# Patient Record
Sex: FEMALE | Race: WHITE | NOT HISPANIC OR LATINO | Employment: OTHER | URBAN - METROPOLITAN AREA
[De-identification: names, ages, dates, MRNs, and addresses within clinical notes are randomized per-mention and may not be internally consistent; named-entity substitution may affect disease eponyms.]

---

## 2017-11-30 ENCOUNTER — OFFICE VISIT (OUTPATIENT)
Dept: URGENT CARE | Facility: CLINIC | Age: 62
End: 2017-11-30
Payer: COMMERCIAL

## 2017-11-30 VITALS
RESPIRATION RATE: 17 BRPM | HEART RATE: 75 BPM | BODY MASS INDEX: 30.75 KG/M2 | SYSTOLIC BLOOD PRESSURE: 131 MMHG | OXYGEN SATURATION: 98 % | HEIGHT: 72 IN | DIASTOLIC BLOOD PRESSURE: 75 MMHG | TEMPERATURE: 99 F | WEIGHT: 227 LBS

## 2017-11-30 DIAGNOSIS — T23.262A PARTIAL THICKNESS BURN OF BACK OF LEFT HAND, INITIAL ENCOUNTER: Primary | ICD-10-CM

## 2017-11-30 PROCEDURE — 99203 OFFICE O/P NEW LOW 30 MIN: CPT | Mod: S$GLB,,, | Performed by: EMERGENCY MEDICINE

## 2017-11-30 RX ORDER — DOXYCYCLINE HYCLATE 50 MG/1
50 CAPSULE ORAL NIGHTLY
COMMUNITY
Start: 2017-11-16

## 2017-11-30 RX ORDER — VALSARTAN 80 MG/1
TABLET ORAL
COMMUNITY
Start: 2017-09-28 | End: 2019-02-25

## 2017-11-30 RX ORDER — TRIAMCINOLONE ACETONIDE 1 MG/G
CREAM TOPICAL
COMMUNITY
Start: 2017-10-18 | End: 2019-02-25

## 2017-11-30 RX ORDER — PANTOPRAZOLE SODIUM 40 MG/1
TABLET, DELAYED RELEASE ORAL
COMMUNITY
Start: 2017-11-29

## 2017-11-30 RX ORDER — ESTRADIOL 0.05 MG/D
FILM, EXTENDED RELEASE TRANSDERMAL
COMMUNITY
Start: 2017-11-26

## 2017-11-30 RX ORDER — PROGESTERONE 100 MG/1
CAPSULE ORAL
COMMUNITY
Start: 2017-09-07
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
## 2017-11-30 NOTE — PATIENT INSTRUCTIONS
Please return here or go to the Emergency Department for any concerns or worsening of condition.  If you were prescribed antibiotics, please take them to completion.  If you were prescribed a narcotic medication, do not drive or operate heavy equipment or machinery while taking these medications.  Please follow up with your primary care doctor or specialist as needed.  If you  smoke, please stop smoking.      Burn Wound: Wound Check, No Infection  Your burn is healing as expected.  Home care  Follow these guidelines when caring for yourself at home:  · If a bandage was put on, you should change it once a day, unless told otherwise. If the bandage sticks, soak it off in warm water. A bandage left in place too long can make an infection worse.  · Wash the area with soap and water to remove all cream, ointment, ooze, or scab. You may do this in a sink, under a tub faucet, or in the shower. Rinse off the soap and pat dry with a clean towel. Look for signs of infection.  · Put cream or ointment on the wound to prevent infection and to keep the bandage from sticking.  · Cover the burn with nonstick gauze. Then wrap it with the bandage material.  · If the bandage becomes wet or soiled, change it as soon as you can.  · You may use acetaminophen or ibuprofen to control pain, unless another pain medicine was prescribed. If you have chronic liver or kidney disease, talk with your healthcare provider before using these medicines. Also talk with your provider if youve had a stomach ulcer or GI (gastrointestinal) bleeding.  · Ask your provider if you need a tetanus shot.  Follow-up care  Follow up with your healthcare provider, or as advised. Most burns heal without infection. Sometimes an infection may occur even with proper treatment. So check the burn every day for the signs of infection listed below.  When to seek medical advice  Call your healthcare provider right away if any of these occur:  · Pain in the wound gets  worse  · Redness or swelling gets worse  · Pus comes from the wound  · Fever of 100.4°F (38°C) or higher, or as directed by your healthcare provider  Date Last Reviewed: 1/1/2017  © 4172-7209 [employer]. [address] [place] [address], [place] [address]. All rights reserved. This information is not intended as a substitute for professional medical care. Always follow your healthcare professional's instructions.

## 2017-12-04 ENCOUNTER — TELEPHONE (OUTPATIENT)
Dept: URGENT CARE | Facility: CLINIC | Age: 62
End: 2017-12-04

## 2018-11-30 ENCOUNTER — OFFICE VISIT (OUTPATIENT)
Dept: URGENT CARE | Facility: CLINIC | Age: 63
End: 2018-11-30
Payer: COMMERCIAL

## 2018-11-30 VITALS
HEART RATE: 70 BPM | DIASTOLIC BLOOD PRESSURE: 85 MMHG | HEIGHT: 72 IN | BODY MASS INDEX: 30.75 KG/M2 | OXYGEN SATURATION: 97 % | WEIGHT: 227 LBS | SYSTOLIC BLOOD PRESSURE: 132 MMHG | TEMPERATURE: 98 F | RESPIRATION RATE: 16 BRPM

## 2018-11-30 DIAGNOSIS — H11.31 SUBCONJUNCTIVAL HEMORRHAGE OF RIGHT EYE: Primary | ICD-10-CM

## 2018-11-30 DIAGNOSIS — R51.9 RIGHT-SIDED HEADACHE: ICD-10-CM

## 2018-11-30 PROCEDURE — 3008F BODY MASS INDEX DOCD: CPT | Mod: CPTII,S$GLB,, | Performed by: FAMILY MEDICINE

## 2018-11-30 PROCEDURE — 99214 OFFICE O/P EST MOD 30 MIN: CPT | Mod: S$GLB,,, | Performed by: FAMILY MEDICINE

## 2018-11-30 RX ORDER — CANDESARTAN 16 MG/1
16 TABLET ORAL DAILY
COMMUNITY
End: 2020-10-06

## 2018-11-30 NOTE — PROGRESS NOTES
Subjective:       Patient ID: Rena Del Valle is a 63 y.o. female.    Vitals:  height is 6' (1.829 m) and weight is 103 kg (227 lb). Her temperature is 97.5 °F (36.4 °C). Her blood pressure is 132/85 and her pulse is 70. Her respiration is 16 and oxygen saturation is 97%.     Chief Complaint: Eye Problem and Headache    Pt states she has right sided head pain and right eye pain. No Trauma. Pt states she only has a headache in the right temporal region when she bends over.        Constitution: Negative for chills, fatigue and fever.   HENT: Negative for congestion and sore throat.    Neck: Negative for painful lymph nodes.   Cardiovascular: Negative for chest pain and leg swelling.   Eyes: Negative for double vision and blurred vision.   Respiratory: Negative for cough and shortness of breath.    Gastrointestinal: Negative for nausea, vomiting and diarrhea.   Genitourinary: Negative for dysuria, frequency, urgency and history of kidney stones.   Musculoskeletal: Negative for joint pain, joint swelling, muscle cramps and muscle ache.   Skin: Negative for color change, pale, rash and bruising.   Allergic/Immunologic: Negative for seasonal allergies.   Neurological: Positive for headaches. Negative for dizziness, history of vertigo, light-headedness and passing out.   Hematologic/Lymphatic: Negative for swollen lymph nodes.   Psychiatric/Behavioral: Negative for nervous/anxious, sleep disturbance and depression. The patient is not nervous/anxious.        Objective:      Physical Exam   Constitutional: She is oriented to person, place, and time. She appears well-developed and well-nourished.   HENT:   Head: Normocephalic and atraumatic.       Right Ear: External ear normal.   Left Ear: External ear normal.   Nose: Nose normal.   Mouth/Throat: Oropharynx is clear and moist.   Eyes: EOM and lids are normal. Pupils are equal, round, and reactive to light. Right conjunctiva has a hemorrhage (with eversion of lid).       Neck:  Trachea normal, full passive range of motion without pain and phonation normal. Neck supple.   Cardiovascular: Normal rate.   Pulmonary/Chest: Effort normal.   Musculoskeletal: Normal range of motion.   Neurological: She is alert and oriented to person, place, and time.   Skin: Skin is warm, dry and intact.   Psychiatric: She has a normal mood and affect. Her speech is normal and behavior is normal. Judgment and thought content normal. Cognition and memory are normal.   Nursing note and vitals reviewed.      Assessment:       1. Subconjunctival hemorrhage of right eye    2. Right-sided headache        Plan:         Subconjunctival hemorrhage of right eye    Right-sided headache        advised that blood tinged eye discharge from subconj hem visible in right. Advised that I would want someone to visualize her retina/optic nerve to see if any pathology. She will try her ophthalmologist.  Pt v/u. Advised that she should go to ED if HA is persistent. Pt v/u

## 2019-01-17 ENCOUNTER — OFFICE VISIT (OUTPATIENT)
Dept: OTOLARYNGOLOGY | Facility: CLINIC | Age: 64
End: 2019-01-17
Payer: COMMERCIAL

## 2019-01-17 VITALS
HEART RATE: 86 BPM | DIASTOLIC BLOOD PRESSURE: 79 MMHG | WEIGHT: 232.38 LBS | SYSTOLIC BLOOD PRESSURE: 140 MMHG | BODY MASS INDEX: 31.51 KG/M2

## 2019-01-17 DIAGNOSIS — E04.1 NONTOXIC THYROID NODULE: ICD-10-CM

## 2019-01-17 PROCEDURE — 99205 OFFICE O/P NEW HI 60 MIN: CPT | Mod: S$GLB,,, | Performed by: OTOLARYNGOLOGY

## 2019-01-17 PROCEDURE — 99999 PR PBB SHADOW E&M-EST. PATIENT-LVL III: ICD-10-PCS | Mod: PBBFAC,,, | Performed by: OTOLARYNGOLOGY

## 2019-01-17 PROCEDURE — 99999 PR PBB SHADOW E&M-EST. PATIENT-LVL III: CPT | Mod: PBBFAC,,, | Performed by: OTOLARYNGOLOGY

## 2019-01-17 PROCEDURE — 99205 PR OFFICE/OUTPT VISIT, NEW, LEVL V, 60-74 MIN: ICD-10-PCS | Mod: S$GLB,,, | Performed by: OTOLARYNGOLOGY

## 2019-01-17 PROCEDURE — 3008F BODY MASS INDEX DOCD: CPT | Mod: CPTII,S$GLB,, | Performed by: OTOLARYNGOLOGY

## 2019-01-17 PROCEDURE — 3008F PR BODY MASS INDEX (BMI) DOCUMENTED: ICD-10-PCS | Mod: CPTII,S$GLB,, | Performed by: OTOLARYNGOLOGY

## 2019-01-17 NOTE — PROGRESS NOTES
[facility]    Subjective:       Patient ID: Rena Del Valle is a 63 y.o. female.    Chief Complaint: Consult (thyroid nodule)    HPI  Rena Del Valle is a 63 y.o. female who presents for evaluation of a several week history of a central neck mass/goiter. She has not noticed a central mass in the neck. Her saga started in the fall when she had routine blood work that revealed elevated TPO antibodies.  She remained euthyroid, however.  She had an ultrasound and then an FNA at that time. She has left-sided nodules evident on ultrasound, with a dominant nodule evident on the left.     The cytology from this FNA revealed atypia of uncertain significance, so the Affirma test was performed, which in turn place the risk of malignancy around 50%.  She had been working with the general surgeon on the [place] and then decided to seek some other opinions.  She has family members that work at [facility] as well as here, so she presents for an opinion.  Other than subjectively being aware of the mass, she is predominantly asymptomatic.    Recently, She has had some compressive symptoms such as a globus sensation and mild dysphagia. She thinks that her voice is occasionally hoarse, but mostly normal. She denies odynophagia, throat pain, and otalgia. She is a non-smoker. She denies cough and throat clearing. There is not hemoptysis or hematemesis. She does not  experience shortness of breath occasionally. There is no family history of thyroid disease or cancer. There is no personal history of radiation exposure or treatment to the head and neck region.    Past Medical History:   Diagnosis Date    Breast cyst     GERD (gastroesophageal reflux disease)     Nontoxic thyroid nodule 1/27/2019       Past Surgical History:   Procedure Laterality Date    BREAST BIOPSY      pt does not remember    SHOULDER SURGERY Right          Current Outpatient Medications:     candesartan (ATACAND) 16 MG tablet, Take 16 mg  by mouth once daily., Disp: , Rfl:     doxycycline (VIBRAMYCIN) 50 MG capsule, , Disp: , Rfl:     estradiol 0.05 mg/24 hr td ptsw (VIVELLE-DOT) 0.05 mg/24 hr, , Disp: , Rfl:     pantoprazole (PROTONIX) 40 MG tablet, , Disp: , Rfl:     progesterone (PROMETRIUM) 100 MG capsule, , Disp: , Rfl:     ranitidine (ZANTAC) 300 MG tablet, , Disp: , Rfl:     spironolactone-hydrochlorothiazide (ALDACTAZIDE) 50-50 mg per tablet, Take 1 tablet by mouth once daily., Disp: , Rfl:     triamcinolone acetonide 0.1% (KENALOG) 0.1 % cream, , Disp: , Rfl:     valsartan (DIOVAN) 80 MG tablet, , Disp: , Rfl:     Review of patient's allergies indicates:   Allergen Reactions    Ciprofloxacin        Social History     Socioeconomic History    Marital status:      Spouse name: Not on file    Number of children: Not on file    Years of education: Not on file    Highest education level: Not on file   Social Needs    Financial resource strain: Not on file    Food insecurity - worry: Not on file    Food insecurity - inability: Not on file    Transportation needs - medical: Not on file    Transportation needs - non-medical: Not on file   Occupational History    Not on file   Tobacco Use    Smoking status: Never Smoker    Smokeless tobacco: Never Used   Substance and Sexual Activity    Alcohol use: No    Drug use: Not on file    Sexual activity: Not on file   Other Topics Concern    Not on file   Social History Narrative    Not on file       Family History   Problem Relation Age of Onset    Breast cancer Maternal Aunt         60's    Hypertension Mother     Hypertension Father     Stroke Father        Review of Systems   Constitutional: Negative for activity change, appetite change, chills, fatigue, fever and unexpected weight change.   HENT: Positive for trouble swallowing. Negative for congestion, dental problem, drooling, ear discharge, ear pain, facial swelling, hearing loss, mouth sores, nosebleeds,  postnasal drip, rhinorrhea, sinus pressure, sneezing, sore throat, tinnitus and voice change.    Eyes: Negative for pain and visual disturbance.   Respiratory: Negative for cough, choking and shortness of breath.    Cardiovascular: Negative for chest pain, palpitations and leg swelling.   Gastrointestinal: Negative for abdominal pain, constipation, diarrhea, nausea and vomiting.   Endocrine: Negative for cold intolerance and heat intolerance.   Genitourinary: Negative for difficulty urinating, dysuria and hematuria.   Musculoskeletal: Negative for arthralgias, neck pain and neck stiffness.   Skin: Negative for rash and wound.   Allergic/Immunologic: Negative for environmental allergies and immunocompromised state.   Neurological: Negative for dizziness, facial asymmetry, speech difficulty, weakness, light-headedness, numbness and headaches.   Hematological: Negative for adenopathy. Does not bruise/bleed easily.   Psychiatric/Behavioral: Negative for dysphoric mood. The patient is not nervous/anxious.        Objective:     Physical Exam   Constitutional: She is oriented to person, place, and time. She appears well-developed and well-nourished. No distress.   HENT:   Head: Normocephalic and atraumatic.       Right Ear: Hearing, tympanic membrane, external ear and ear canal normal.   Left Ear: Hearing, tympanic membrane, external ear and ear canal normal.   Nose: No rhinorrhea, nasal deformity or septal deviation. No epistaxis. Right sinus exhibits no maxillary sinus tenderness and no frontal sinus tenderness. Left sinus exhibits no maxillary sinus tenderness and no frontal sinus tenderness.   Mouth/Throat: Uvula is midline, oropharynx is clear and moist and mucous membranes are normal. She does not have dentures. No oral lesions. No trismus in the jaw. Normal dentition. No dental caries. No oropharyngeal exudate or posterior oropharyngeal edema.   NP: No lesions of posterior wall  OP: No lesions of posterior wall or  BOT. BOT is soft to palpation  Larynx: No lesions of glottic or supraglottic larynx. Normal vocal fold mobility    HP: No lesions of pyriform sinuses or postcricoid region  Mirror examination was performed.     Eyes: EOM and lids are normal. Pupils are equal, round, and reactive to light. Right eye exhibits no chemosis. Left eye exhibits no chemosis. No scleral icterus.   Neck: Trachea normal, normal range of motion, full passive range of motion without pain and phonation normal. No muscular tenderness present. Carotid bruit is not present. No thyroid mass and no thyromegaly present.   Cardiovascular: Normal rate, regular rhythm and intact distal pulses.   Pulmonary/Chest: Effort normal. No accessory muscle usage or stridor. No respiratory distress.   Abdominal: Normal appearance. There is no tenderness.   Musculoskeletal:        Right shoulder: She exhibits normal range of motion and no deformity.        Left shoulder: She exhibits normal range of motion and no deformity.   Lymphadenopathy:        Head (right side): No submental and no occipital adenopathy present.        Head (left side): No submental and no occipital adenopathy present.     She has no cervical adenopathy.        Right cervical: No deep cervical and no posterior cervical adenopathy present.       Left cervical: No deep cervical and no posterior cervical adenopathy present.   Neurological: She is alert and oriented to person, place, and time. No cranial nerve deficit or sensory deficit. Gait normal.   Skin: Skin is warm and intact. No lesion and no rash noted.   Psychiatric: She has a normal mood and affect. Her speech is normal and behavior is normal. Thought content normal.   Vitals reviewed.       Thyroid US 10/30/2018:  FINDINGS:  The right lobe of the thyroid measures 4.5 x 1.2 x 1.2 cm with a heterogeneous echotexture.  No significant increased vascularity to the right lobe of the thyroid.    Isthmus measures approximately 3 mm with  heterogeneous echotexture.  No significant soft tissue nodules noted.    The left lobe of the thyroid measures 4.6 x 1.6 x 1.5 cm with heterogeneous echogenicity.  In the lower pole of the left lobe there is a solid mostly hypoechoic nodule with slight increased peripheral vascularity measuring approximately 2.3 x 1.4 x 1.4 cm.  In the upper pole no significant soft tissue masses are visualized.      Assessment & Plan:       Problem List Items Addressed This Visit     Nontoxic thyroid nodule     Ms. Del Valle has a 2.3cm in maximum dimension left thyroid nodule in the inferior pole. This was shown to be AUS on FNA, and subsequent Affirma testing placed its risk of malignancy at 50%, which is consistent with the risk for such a histopathologic report.  She has a multitude of questions about thyroid disease, thyroid surgery, and, importantly, her options.  I spent over an hour discussing these and subsequent questions with her and her family, and all questions were answered to their apparent satisfaction.    We discussed how common thyroid nodules are in the general population. However, I explained that the vast majority of thyroid nodules are not malignant and that there are certain steps that we have to take to evaluate thyroid nodules for malignancy.   I then discussed with the patient the potential outcomes of the fine-needle aspiration in broad terms: Benign, malignant or suspicious, AUS/FLUS/follicular lesion/suspicious for follicular neoplasm (which I described as suspicious for being suspicious, suspicious for being suspicious for being suspicious, and suspicious for being suspicious for being suspicious for being suspicious), or indeterminate.  Recommendations for treatment are predicated on this result.  In general benign lesions can be followed with intermittent ultrasound.  My recommendation for malignant or suspicious lesions is total thyroidectomy, potentially with radioactive iodine remnant ablation in the  setting of malignancy.  Follicular lesions merit thyroid lobectomy or total thyroidectomy, depending on the specific clinical picture, with a stronger recommendation for surgery when atypia of uncertain significance is encountered.  Similarly, either repeat ultrasound-guided needle biopsy or surgery is a reasonable course of action for the indeterminate biopsy result, again depending on the specific clinical picture.      I offered the patient 3 options: observation with repeat ultrasound 6 months, left thyroid lobectomy, and total thyroidectomy.   We discussed the relative risks and benefits of each approach. Although surgery may be avoided with observation, there is a chance that malignancy could go undetected, allowing for potential growth or even metastasis. With her AUS report, I am not comfortable with recommending this, but would closely follow her in a shared decision making environment if that is what she wanted.  A thyroid lobectomy limits the surgical risk and offers a roughly 40-50% chance that she will not require synthroid replacement therapy - except in the setting of Hashimoto's thyroiditis, which she has. If malignancy is detected on permanent section, then a second procedure would be required within 2-4 weeks for completion thyroidectomy. Although there is no difference in the risk of surgical complications with delayed total thyroidectomy versus immediate total thyroidectomy, that would require a second anesthetic. Frozen section analysis could be accomplished, but there are limitations on its accuracy in certain conditions, particularly follicular thyroid neoplasms. Finally, a total thyroidectomy could be accomplished, which would limit the number of procedures required, but the chance exists that no malignancy would be detected and the patient would now be obligated to synthroid replacement therapy for the rest of her life.  She is not interested in total thyroidectomy if it can be  avoided.    As such,  because the nodule was read as AUS/50% risk in the setting of a single dominant nodule, I have recommended a left thyroid lobectomy and isthmusectomy, with potential total thyroidectomy if there are obvious signs of malignancy such as clinically abnormal lymph nodes.    After this, I explained the risks of thyroidectomy include, but are not limited to, infection, bleeding, scarring, failure to achieve the diagnosis, no evidence of cancer, recurrence, collection of blood or tissue fluid requiring drainage, injury to the recurrent laryngeal nerve with resultant temporary or permanent hoarseness (1% permanent risk with up to 10% temporary risk, greater in revision operations), injury to the superior laryngeal nerve with resultant loss of the upper register for singing or challenges with yelling, temporary or permanent hypocalcemia related to injury or devascularization of the parathyroid glands (less than 5% permanent, up to 30-60% when paratracheal dissection is accomplished, again greater in revision operations), and the need for additional procedures or therapies.  Time was allowed for questions, and all questions were answered to the patient's apparent satisfaction. The risks of paratracheal lymph node dissection are included above.     She is still contemplating a trip to [place] and will have her slides reviewed there.  By the time of note completion, I have included the [facility] report for our records.  Her other records have been scanned into Epic.

## 2019-01-21 ENCOUNTER — PATIENT MESSAGE (OUTPATIENT)
Dept: OTOLARYNGOLOGY | Facility: CLINIC | Age: 64
End: 2019-01-21

## 2019-01-23 ENCOUNTER — TELEPHONE (OUTPATIENT)
Dept: OTOLARYNGOLOGY | Facility: CLINIC | Age: 64
End: 2019-01-23

## 2019-01-23 ENCOUNTER — PATIENT MESSAGE (OUTPATIENT)
Dept: OTOLARYNGOLOGY | Facility: CLINIC | Age: 64
End: 2019-01-23

## 2019-01-23 NOTE — TELEPHONE ENCOUNTER
----- Message from Vannessa Robin sent at 1/23/2019 10:19 AM CST -----  Contact: Pt  PT called to speak to the nurse to give the provider the telephone number for [facility] which is [phone], so that the doctor can request the [facility] Pathology Report. Pt stated that her Medical Record Number with [facility] Brown is [id number]. Pt also stated that she left a previous message with the doctor's office but haven't received a return call back.     Pt can be reached at [phone].    Thanks

## 2019-01-24 ENCOUNTER — PATIENT MESSAGE (OUTPATIENT)
Dept: OTOLARYNGOLOGY | Facility: CLINIC | Age: 64
End: 2019-01-24

## 2019-01-25 ENCOUNTER — PATIENT MESSAGE (OUTPATIENT)
Dept: OTOLARYNGOLOGY | Facility: CLINIC | Age: 64
End: 2019-01-25

## 2019-01-27 DIAGNOSIS — E04.1 NONTOXIC THYROID NODULE: Primary | ICD-10-CM

## 2019-01-27 RX ORDER — DEXAMETHASONE SODIUM PHOSPHATE 4 MG/ML
12 INJECTION, SOLUTION INTRA-ARTICULAR; INTRALESIONAL; INTRAMUSCULAR; INTRAVENOUS; SOFT TISSUE ONCE
Status: CANCELLED | OUTPATIENT
Start: 2019-02-27

## 2019-01-27 RX ORDER — LIDOCAINE HYDROCHLORIDE 10 MG/ML
1 INJECTION, SOLUTION EPIDURAL; INFILTRATION; INTRACAUDAL; PERINEURAL ONCE
Status: CANCELLED | OUTPATIENT
Start: 2019-01-27 | End: 2019-01-27

## 2019-01-27 NOTE — ASSESSMENT & PLAN NOTE
Ms. Del Valle has a 2.3cm in maximum dimension left thyroid nodule in the inferior pole. This was shown to be AUS on FNA, and subsequent Affirma testing placed its risk of malignancy at 50%, which is consistent with the risk for such a histopathologic report.  She has a multitude of questions about thyroid disease, thyroid surgery, and, importantly, her options.  I spent over an hour discussing these and subsequent questions with her and her family, and all questions were answered to their apparent satisfaction.    We discussed how common thyroid nodules are in the general population. However, I explained that the vast majority of thyroid nodules are not malignant and that there are certain steps that we have to take to evaluate thyroid nodules for malignancy.   I then discussed with the patient the potential outcomes of the fine-needle aspiration in broad terms: Benign, malignant or suspicious, AUS/FLUS/follicular lesion/suspicious for follicular neoplasm (which I described as suspicious for being suspicious, suspicious for being suspicious for being suspicious, and suspicious for being suspicious for being suspicious for being suspicious), or indeterminate.  Recommendations for treatment are predicated on this result.  In general benign lesions can be followed with intermittent ultrasound.  My recommendation for malignant or suspicious lesions is total thyroidectomy, potentially with radioactive iodine remnant ablation in the setting of malignancy.  Follicular lesions merit thyroid lobectomy or total thyroidectomy, depending on the specific clinical picture, with a stronger recommendation for surgery when atypia of uncertain significance is encountered.  Similarly, either repeat ultrasound-guided needle biopsy or surgery is a reasonable course of action for the indeterminate biopsy result, again depending on the specific clinical picture.      I offered the patient 3 options: observation with repeat ultrasound 6  months, left thyroid lobectomy, and total thyroidectomy.   We discussed the relative risks and benefits of each approach. Although surgery may be avoided with observation, there is a chance that malignancy could go undetected, allowing for potential growth or even metastasis. With her AUS report, I am not comfortable with recommending this, but would closely follow her in a shared decision making environment if that is what she wanted.  A thyroid lobectomy limits the surgical risk and offers a roughly 40-50% chance that she will not require synthroid replacement therapy - except in the setting of Hashimoto's thyroiditis, which she has. If malignancy is detected on permanent section, then a second procedure would be required within 2-4 weeks for completion thyroidectomy. Although there is no difference in the risk of surgical complications with delayed total thyroidectomy versus immediate total thyroidectomy, that would require a second anesthetic. Frozen section analysis could be accomplished, but there are limitations on its accuracy in certain conditions, particularly follicular thyroid neoplasms. Finally, a total thyroidectomy could be accomplished, which would limit the number of procedures required, but the chance exists that no malignancy would be detected and the patient would now be obligated to synthroid replacement therapy for the rest of her life.  She is not interested in total thyroidectomy if it can be avoided.    As such,  because the nodule was read as AUS/50% risk in the setting of a single dominant nodule, I have recommended a left thyroid lobectomy and isthmusectomy, with potential total thyroidectomy if there are obvious signs of malignancy such as clinically abnormal lymph nodes.    After this, I explained the risks of thyroidectomy include, but are not limited to, infection, bleeding, scarring, failure to achieve the diagnosis, no evidence of cancer, recurrence, collection of blood or tissue  fluid requiring drainage, injury to the recurrent laryngeal nerve with resultant temporary or permanent hoarseness (1% permanent risk with up to 10% temporary risk, greater in revision operations), injury to the superior laryngeal nerve with resultant loss of the upper register for singing or challenges with yelling, temporary or permanent hypocalcemia related to injury or devascularization of the parathyroid glands (less than 5% permanent, up to 30-60% when paratracheal dissection is accomplished, again greater in revision operations), and the need for additional procedures or therapies.  Time was allowed for questions, and all questions were answered to the patient's apparent satisfaction. The risks of paratracheal lymph node dissection are included above.     She is still contemplating a trip to [place] and will have her slides reviewed there.  By the time of note completion, I have included the MD Brown report for our records.  Her other records have been scanned into Epic.

## 2019-01-29 DIAGNOSIS — E04.1 NONTOXIC THYROID NODULE: Primary | ICD-10-CM

## 2019-01-31 DIAGNOSIS — G44.52 NEW DAILY PERSISTENT HEADACHE: Primary | ICD-10-CM

## 2019-02-01 ENCOUNTER — PATIENT MESSAGE (OUTPATIENT)
Dept: OTOLARYNGOLOGY | Facility: CLINIC | Age: 64
End: 2019-02-01

## 2019-02-01 ENCOUNTER — OFFICE VISIT (OUTPATIENT)
Dept: OTOLARYNGOLOGY | Facility: CLINIC | Age: 64
End: 2019-02-01
Payer: COMMERCIAL

## 2019-02-01 ENCOUNTER — HOSPITAL ENCOUNTER (OUTPATIENT)
Dept: RADIOLOGY | Facility: HOSPITAL | Age: 64
Discharge: HOME | End: 2019-02-01
Attending: OTOLARYNGOLOGY
Payer: COMMERCIAL

## 2019-02-01 VITALS
DIASTOLIC BLOOD PRESSURE: 90 MMHG | WEIGHT: 233.94 LBS | HEART RATE: 94 BPM | BODY MASS INDEX: 31.69 KG/M2 | SYSTOLIC BLOOD PRESSURE: 128 MMHG | HEIGHT: 72 IN

## 2019-02-01 DIAGNOSIS — G44.52 NEW DAILY PERSISTENT HEADACHE: ICD-10-CM

## 2019-02-01 DIAGNOSIS — R51.9 RIGHT TEMPORAL HEADACHE: Primary | ICD-10-CM

## 2019-02-01 DIAGNOSIS — J34.89 RHINORRHEA: ICD-10-CM

## 2019-02-01 DIAGNOSIS — J34.2 DEVIATED NASAL SEPTUM: ICD-10-CM

## 2019-02-01 PROCEDURE — 3008F BODY MASS INDEX DOCD: CPT | Mod: CPTII,S$GLB,, | Performed by: OTOLARYNGOLOGY

## 2019-02-01 PROCEDURE — 31231 NASAL/SINUS ENDOSCOPY: ICD-10-PCS | Mod: S$GLB,,, | Performed by: OTOLARYNGOLOGY

## 2019-02-01 PROCEDURE — 99215 PR OFFICE/OUTPT VISIT, EST, LEVL V, 40-54 MIN: ICD-10-PCS | Mod: 25,S$GLB,, | Performed by: OTOLARYNGOLOGY

## 2019-02-01 PROCEDURE — 31231 NASAL ENDOSCOPY DX: CPT | Mod: S$GLB,,, | Performed by: OTOLARYNGOLOGY

## 2019-02-01 PROCEDURE — 70486 CT MAXILLOFACIAL W/O DYE: CPT | Mod: TC

## 2019-02-01 PROCEDURE — 99999 PR PBB SHADOW E&M-EST. PATIENT-LVL III: ICD-10-PCS | Mod: PBBFAC,,, | Performed by: OTOLARYNGOLOGY

## 2019-02-01 PROCEDURE — 70486 CT MEDTRONIC SINUSES WITHOUT: ICD-10-PCS | Mod: 26,,, | Performed by: RADIOLOGY

## 2019-02-01 PROCEDURE — 99215 OFFICE O/P EST HI 40 MIN: CPT | Mod: 25,S$GLB,, | Performed by: OTOLARYNGOLOGY

## 2019-02-01 PROCEDURE — 99999 PR PBB SHADOW E&M-EST. PATIENT-LVL III: CPT | Mod: PBBFAC,,, | Performed by: OTOLARYNGOLOGY

## 2019-02-01 PROCEDURE — 70486 CT MAXILLOFACIAL W/O DYE: CPT | Mod: 26,,, | Performed by: RADIOLOGY

## 2019-02-01 PROCEDURE — 3008F PR BODY MASS INDEX (BMI) DOCUMENTED: ICD-10-PCS | Mod: CPTII,S$GLB,, | Performed by: OTOLARYNGOLOGY

## 2019-02-01 NOTE — Clinical Note
February 1, 2019      Luis A Barron MD  [address]  [facility] [address]           [facility] - Otorhinolaryngology  [address]  [facility] [address]  Phone: [phone]  Fax: [phone]          Patient: Rena Del Valle   MR Number: [id number]   YOB: 1955   Date of Visit: 2/1/2019       Dear Dr. Luis A Barron:    Thank you for referring Rena Del Valle to me for evaluation. Attached you will find relevant portions of my assessment and plan of care.    If you have questions, please do not hesitate to call me. I look forward to following Rena Del Valle along with you.    Sincerely,    Yusuf Daily MD    Enclosure  CC:  No Recipients    If you would like to receive this communication electronically, please contact [email or web address] or [phone] to request more information on [employer] Link access.    For providers and/or their staff who would like to refer a patient to [facility], please contact us through our one-stop-shop provider referral line, [facility], at [phone].    If you feel you have received this communication in error or would no longer like to receive these types of communications, please e-mail [email or web address]

## 2019-02-01 NOTE — PROGRESS NOTES
Subjective:      Rena Del Valle is a 63 y.o. female who was referred to me by Dr. Luis A Barron in consultation for rhinorrhea and headache.    She was in her usual state of health until nearly 4 months ago when she had the acute onset of right-sided temporal-parietal headaches, which are sporadic and unpredictable and last for only a moment.  These are provoked often by leaning over or coughing.  She has also been aware during this time of often feeling that her nose is moist, and is constantly wiping it with a tissue.  She describes hyposmia ever since a severe sinus infection about 5 years ago.  She denies nasal blockage, facial pressure, postnasal drip, aural fullness, pruritic symptoms or frequent sinus infections.    Current sinonasal medications as above.  The last course of antibiotics was a long time ago.  She does not regularly use nasal decongestant sprays.    She does not recall previously having allergy testing.    She denies a history of asthma.    She relates a history of reflux symptoms which is currently managed with zantac.  She has previously had an EGD.    She denies have a diagnosis of obstructive sleep apnea.     She has not had sinonasal surgery.    She does not recall a prior history of nasal trauma.    SNOT-22 score = 13, NOSE score = 0%, ETDQ-7 score = 1.0    Global QOL = 95%    Days missed = Less than 5      Past Medical History  She has a past medical history of Breast cyst, GERD (gastroesophageal reflux disease), and Nontoxic thyroid nodule.    Past Surgical History  She has a past surgical history that includes Shoulder surgery (Right) and Breast biopsy.    Family History  Her family history includes Breast cancer in her maternal aunt; Hypertension in her father and mother; Stroke in her father.    Social History  She reports that  has never smoked. she has never used smokeless tobacco. She reports that she does not drink alcohol.    Allergies  She is allergic to  ciprofloxacin.    Medications   She has a current medication list which includes the following prescription(s): candesartan, doxycycline, estradiol 0.05 mg/24 hr td ptsw, pantoprazole, progesterone, ranitidine, spironolactone-hydrochlorothiazide, triamcinolone acetonide 0.1%, and valsartan.    Review of Systems  Review of Systems   Constitutional: Negative for fatigue, fever and unexpected weight change.   HENT: Positive for rhinorrhea. Negative for congestion, dental problem, ear discharge, ear pain, facial swelling, hearing loss, hoarse voice, nosebleeds, postnasal drip, sinus pressure, sore throat, tinnitus, trouble swallowing and voice change.    Eyes: Negative for photophobia, discharge, itching and visual disturbance.   Respiratory: Negative for apnea, cough, shortness of breath and wheezing.    Cardiovascular: Negative for chest pain and palpitations.   Gastrointestinal: Negative for abdominal pain, nausea and vomiting.   Endocrine: Negative for cold intolerance and heat intolerance.   Genitourinary: Negative for difficulty urinating.   Musculoskeletal: Negative for arthralgias, back pain, myalgias and neck pain.   Skin: Negative for rash.   Allergic/Immunologic: Negative for environmental allergies and food allergies.   Neurological: Positive for headaches. Negative for dizziness, seizures, syncope and weakness.   Hematological: Negative for adenopathy. Does not bruise/bleed easily.   Psychiatric/Behavioral: Negative for decreased concentration, dysphoric mood and sleep disturbance. The patient is not nervous/anxious.           Objective:     BP (!) 128/90   Pulse 94   Ht 6' (1.829 m)   Wt 106.1 kg (233 lb 14.5 oz)   BMI 31.72 kg/m²        Constitutional:   She appears well-developed. She is cooperative. Normal speech.  No hoarse voice.      Head:  Normocephalic. Salivary glands normal.  Facial strength is normal.      Ears:    Right Ear: No drainage or tenderness. Tympanic membrane is not perforated.  Tympanic membrane mobility is normal. No middle ear effusion. No decreased hearing is noted.   Left Ear: No drainage or tenderness. Tympanic membrane is not perforated. Tympanic membrane mobility is normal.  No middle ear effusion. No decreased hearing is noted.     Nose:  Septal deviation present. No mucosal edema, rhinorrhea or polyps. No epistaxis. Turbinates normal, no turbinate masses and no turbinate hypertrophy.  Right sinus exhibits no maxillary sinus tenderness and no frontal sinus tenderness. Left sinus exhibits no maxillary sinus tenderness and no frontal sinus tenderness.     Mouth/Throat  Oropharynx clear and moist without lesions or asymmetry and normal uvula midline. She does not have dentures. Normal dentition. No oral lesions or mucous membrane lesions. No oropharyngeal exudate or posterior oropharyngeal erythema. Mirror exam not performed due to patient tolerance.  Mirror exam not performed due to patient tolerance.      Neck:  Neck normal without thyromegaly masses, asymmetry, normal tracheal structure, crepitus, and tenderness, thyroid normal, trachea normal and no adenopathy. Normal range of motion present.     She has no cervical adenopathy.     Cardiovascular:   Regular rhythm.      Pulmonary/Chest:   Effort normal.     Psychiatric:   She has a normal mood and affect. Her speech is normal and behavior is normal.     Neurological:   No cranial nerve deficit.     Skin:   No rash noted.       Procedure    Nasal endoscopy performed.  See procedure note.     Left nasal valve     Left mT     Right nasal valve     Right MT     Right SE recess with serous fluid        Data Reviewed    No results found for: WBC  No results found for: EOSINOPHIL  No results found for: EOS  No results found for: PLT  No results found for: GLU  No results found for: IGE    No sinus imaging available.       Assessment:     1. Right temporal headache    2. Rhinorrhea    3. Deviated nasal septum         Plan:     I had a  long discussion with the patient and her daughter regarding her condition and the further workup and management options.  She has a clinical presentation that is atypical but suggestive of possible spontaneous CSF rhinorrhea from the right sphenoid.  I ordered a CT scan of the sinuses to assess for intraluminal obstruction.  I provided her with a specimen cup in which to collect fluid for beta-2 transferrin assay.  If this is positive, the surgical repair of the defect would be considered.  Regardless, I do not anticipate her symptoms to interfere with anesthesia for her upcoming thyroidectomy.    Follow-up for test results.

## 2019-02-01 NOTE — LETTER
2019    Luis A Barron MD  [address] [place] [address]           OTOLARYNGOLOGY AND COMMUNICATION SCIENCES    Luis A Barron MD, FACS          SURGICAL AND MEDICAL DISEASES OF HEAD AND NECK  MD Luis A Flores MD, FACS  Glenroy Starr III, MD    OTOLOGY, NEUROTOLOGY and SKULL-BASE SURGERY  Jorge Miller MD, FACS  Guy Velasco MD, Director    PEDIATRIC OTOLARYNGOLOGY & OTOLOGY  MICHEL Boyce MD, FAAP  Rika Adams MD, FACS    FACIAL PLASTIC and RECONSTRUCTIVE SURGERY  ZACHARIAH Winters III, MD, FACS    RHINOLOGY and SINUS SURGERY  Yusuf Daily MD, MPH, FACS  Director   ZACHARIAH Winters III, MD, FACS    LARYNGOLOGY  Chris Baldwin MD    SPEECH-LANGUAGE PATHOLOGY  Madelin Alanis, PhD, [facility]-SLP  Traci Grace, MS, CCC-SLP  Jolanta Valdivia, MS, CCC-SLP  Belén Garza MA, [facility]-SLP    AUDIOLOGY SECTION  Polina Domínguez, MS, CCC-A  ANA Horta, CCC-A  Marija Goldstein, CCC-A  Marija Dorsey, CCC-A  Mikey Potter Jr., ANA, CCA-A  ANA Horne, CCC-A  Marija Smith, CCC-A    [facility]ACT[facility]  Head and Neck Surgical Oncology  TOY Mac, FNP-C  Pedatric Otolaryngology  TOY Martinez, PNP-C       Re:  Rena Del Valle  :  1955    Dear Dr. Barron,    Thank you for referring your patient, Rena Del Valle, to us for evaluation and treatment.  I have enclosed a copy of my clinic note for your review and records.  If you have any questions please do not hesitate to contact our office.     Thank you for allowing me to participate in the care of your patient.    Sincerely,        Yusuf Daily MD, MPH, FACS    Director, Rhinology and Sinus Surgery  Department of Otorhinolaryngology  [facility]    Encl:  Progress note       [facility] [address] [place] [address]  phone [phone]  fax [phone]  [email or web address]

## 2019-02-02 NOTE — PROCEDURES
Nasal/sinus endoscopy  Date/Time: 2/1/2019 10:28 PM  Performed by: Yusuf Daily MD  Authorized by: Yusuf Daily MD     Consent Done?:  Yes (Verbal)  Anesthesia:     Local anesthetic:  Lidocaine 4% and Kj-Synephrine 1/2%    Patient tolerance:  Patient tolerated the procedure well with no immediate complications  Nose:     Procedure Performed:  Nasal Endoscopy  External:      No external nasal deformity  Intranasal:      Mucosa no polyps     Mucosa ulcers not present     No mucosa lesions present     Turbinates not enlarged     Septum gross deformity  Nasopharynx:      No mucosa lesions     Adenoids not present     Posterior choanae patent     Eustachian tube patent     Conventional sinonasal anatomy.  Possible scant clear rhinorrhea from right sphenoethmoidal recess.

## 2019-02-07 ENCOUNTER — PATIENT MESSAGE (OUTPATIENT)
Dept: OTOLARYNGOLOGY | Facility: CLINIC | Age: 64
End: 2019-02-07

## 2019-02-07 DIAGNOSIS — G44.89 CHRONIC MIXED HEADACHE SYNDROME: ICD-10-CM

## 2019-02-07 DIAGNOSIS — R51.9 RIGHT TEMPORAL HEADACHE: Primary | ICD-10-CM

## 2019-02-08 ENCOUNTER — PATIENT MESSAGE (OUTPATIENT)
Dept: OTOLARYNGOLOGY | Facility: CLINIC | Age: 64
End: 2019-02-08

## 2019-02-13 ENCOUNTER — HOSPITAL ENCOUNTER (OUTPATIENT)
Dept: RADIOLOGY | Facility: HOSPITAL | Age: 64
Discharge: HOME | End: 2019-02-13
Attending: OTOLARYNGOLOGY
Payer: COMMERCIAL

## 2019-02-13 DIAGNOSIS — G44.89 CHRONIC MIXED HEADACHE SYNDROME: ICD-10-CM

## 2019-02-13 PROCEDURE — 25500020 PHARM REV CODE 255: Performed by: OTOLARYNGOLOGY

## 2019-02-13 PROCEDURE — A9585 GADOBUTROL INJECTION: HCPCS | Performed by: OTOLARYNGOLOGY

## 2019-02-13 PROCEDURE — 70553 MRI BRAIN STEM W/O & W/DYE: CPT | Mod: TC

## 2019-02-13 PROCEDURE — 70553 MRI BRAIN STEM W/O & W/DYE: CPT | Mod: 26,,, | Performed by: RADIOLOGY

## 2019-02-13 PROCEDURE — 70553 MRI BRAIN W WO CONTRAST: ICD-10-PCS | Mod: 26,,, | Performed by: RADIOLOGY

## 2019-02-13 RX ORDER — GADOBUTROL 604.72 MG/ML
10 INJECTION INTRAVENOUS
Status: COMPLETED | OUTPATIENT
Start: 2019-02-13 | End: 2019-02-13

## 2019-02-13 RX ADMIN — GADOBUTROL 10 ML: 604.72 INJECTION INTRAVENOUS at 05:02

## 2019-02-14 ENCOUNTER — PATIENT MESSAGE (OUTPATIENT)
Dept: SURGERY | Facility: HOSPITAL | Age: 64
End: 2019-02-14

## 2019-02-14 ENCOUNTER — PATIENT MESSAGE (OUTPATIENT)
Dept: OTOLARYNGOLOGY | Facility: CLINIC | Age: 64
End: 2019-02-14

## 2019-02-14 DIAGNOSIS — Z01.818 PREOP TESTING: Primary | ICD-10-CM

## 2019-02-14 RX ORDER — ASPIRIN 81 MG/1
81 TABLET ORAL
COMMUNITY
End: 2019-04-08

## 2019-02-15 ENCOUNTER — PATIENT MESSAGE (OUTPATIENT)
Dept: SURGERY | Facility: HOSPITAL | Age: 64
End: 2019-02-15

## 2019-02-17 ENCOUNTER — PATIENT MESSAGE (OUTPATIENT)
Dept: SURGERY | Facility: HOSPITAL | Age: 64
End: 2019-02-17

## 2019-02-20 ENCOUNTER — ANESTHESIA EVENT (OUTPATIENT)
Dept: SURGERY | Facility: HOSPITAL | Age: 64
End: 2019-02-20
Payer: COMMERCIAL

## 2019-02-20 NOTE — ANESTHESIA PREPROCEDURE EVALUATION
Anesthesia Assessment: Preoperative EQUATION     Planned Procedure: Procedure(s) (LRB):  LOBECTOMY, THYROID (Left)  Requested Anesthesia Type:General  Surgeon: Luis A Barron MD  Service: ENT  Known or anticipated Date of Surgery:2/27/2019     Surgeon notes: reviewed     Patient reports difficulty arousing in the recovery area following her last surgery. - Voiced concern about prolonged recovery time.          Electronic QUestionnaire Assessment completed via nurse interview with patient.      NO AQ     Triage considerations:      The patient has no apparent active cardiac condition (No unstable coronary Syndrome such as severe unstable angina or recent [<1 month] myocardial infarction, decompensated CHF, severe valvular   disease or significant arrhythmia)     Previous anesthesia records:GETA, Not available and Complications noted  Patient stated she had PONV and difficulty waking up after anesthesia in 2017     [facility]   Subspecialty notes: ENT     Other important co-morbidities: HTN, GERD, Headaches, Thyroid goiter     Tests already available:  Available tests,  within 1 month , within [facility] . 2/2019 Cr                            Instructions given. (See in Nurse's note)     Optimization:  Anesthesia Preop Clinic Assessment  Indicated-not required for this surgery                Plan:    Testing:  EKG and PTH, Calcium                           Patient  has previously scheduled Medical Appointment:  2/25 Neurosurgery     Navigation: Tests Scheduled. 2/21                          Results will be tracked by Preop Clinic.    2/22 Lab, EKG results reviewed     Kaycee Doherty RN                         Electronically signed by Kaycee Doherty RN at 2/20/2019 11:20 AM                                                                                                                02/20/2019  Rena Del Valle is a 63 y.o., female.    Anesthesia Evaluation         Review of Systems  Anesthesia  Hx:  Hx of Anesthetic complications Personal Hx of Anesthesia complications, Post-Operative Nausea/Vomiting Slow To Awaken/Delayed Emergence   Social:  Non-Smoker    Cardiovascular:   Hypertension    Hepatic/GI:   GERD  Esophageal / Stomach Disorders Gerd Controlled by chronic antireflux medication.    Neurological:  Dx of Headaches   Endocrine:   Nontoxic thyroid nodule       Physical Exam  General:  Obesity    Airway/Jaw/Neck:  Airway Findings: Mouth Opening: Normal Tongue: Normal  General Airway Assessment: Adult  Mallampati: III  TM Distance: Normal, at least 6 cm      Dental:  Dental Findings: molar caps        Mental Status:  Mental Status Findings:  Alert and Oriented, Cooperative         Anesthesia Plan  Type of Anesthesia, risks & benefits discussed:  Anesthesia Type:  general  Patient's Preference:   Intra-op Monitoring Plan: standard ASA monitors  Intra-op Monitoring Plan Comments:   Post Op Pain Control Plan: multimodal analgesia  Post Op Pain Control Plan Comments:   Induction:   IV  Beta Blocker:  Patient is not currently on a Beta-Blocker (No further documentation required).       Informed Consent: Patient understands risks and agrees with Anesthesia plan.  Questions answered. Anesthesia consent signed with patient.  ASA Score: 2     Day of Surgery Review of History & Physical:    H&P update referred to the surgeon.         Ready For Surgery From Anesthesia Perspective.

## 2019-02-20 NOTE — PRE ADMISSION SCREENING
Anesthesia Assessment: Preoperative EQUATION    Planned Procedure: Procedure(s) (LRB):  LOBECTOMY, THYROID (Left)  Requested Anesthesia Type:General  Surgeon: Luis A Barron MD  Service: ENT  Known or anticipated Date of Surgery:2/27/2019    Surgeon notes: reviewed    Electronic QUestionnaire Assessment completed via nurse interview with patient.      NO AQ    Triage considerations:     The patient has no apparent active cardiac condition (No unstable coronary Syndrome such as severe unstable angina or recent [<1 month] myocardial infarction, decompensated CHF, severe valvular   disease or significant arrhythmia)    Previous anesthesia records:GETA, Not available and Complications noted  Patient stated she had PONV and difficulty waking up after anesthesia in 2017    Last PCP note: outside [facility]   Subspecialty notes: ENT    Other important co-morbidities: HTN, GERD, Headaches, Thyroid goiter     Tests already available:  Available tests,  within 1 month , within [facility] . 2/2019 Cr            Instructions given. (See in Nurse's note)    Optimization:  Anesthesia Preop Clinic Assessment  Indicated-not required for this surgery        Plan:    Testing:  EKG and PTH, Calcium      Patient  has previously scheduled Medical Appointment:  2/25 Neurosurgery    Navigation: Tests Scheduled. 2/21               Results will be tracked by Preop Clinic.    Kaycee Doherty RN

## 2019-02-21 ENCOUNTER — LAB VISIT (OUTPATIENT)
Dept: LAB | Facility: HOSPITAL | Age: 64
End: 2019-02-21
Attending: ANESTHESIOLOGY
Payer: COMMERCIAL

## 2019-02-21 ENCOUNTER — CLINICAL SUPPORT (OUTPATIENT)
Dept: CARDIOLOGY | Facility: CLINIC | Age: 64
End: 2019-02-21
Payer: COMMERCIAL

## 2019-02-21 DIAGNOSIS — Z01.818 PREOP TESTING: ICD-10-CM

## 2019-02-21 LAB
CALCIUM SERPL-MCNC: 9.5 MG/DL
PTH-INTACT SERPL-MCNC: 81 PG/ML

## 2019-02-21 PROCEDURE — 93000 EKG 12-LEAD: ICD-10-PCS | Mod: S$GLB,,, | Performed by: INTERNAL MEDICINE

## 2019-02-21 PROCEDURE — 82310 ASSAY OF CALCIUM: CPT

## 2019-02-21 PROCEDURE — 36415 COLL VENOUS BLD VENIPUNCTURE: CPT | Mod: PO

## 2019-02-21 PROCEDURE — 93000 ELECTROCARDIOGRAM COMPLETE: CPT | Mod: S$GLB,,, | Performed by: INTERNAL MEDICINE

## 2019-02-21 PROCEDURE — 83970 ASSAY OF PARATHORMONE: CPT

## 2019-02-25 ENCOUNTER — OFFICE VISIT (OUTPATIENT)
Dept: NEUROSURGERY | Facility: CLINIC | Age: 64
End: 2019-02-25
Payer: COMMERCIAL

## 2019-02-25 ENCOUNTER — TELEPHONE (OUTPATIENT)
Dept: NEUROSURGERY | Facility: CLINIC | Age: 64
End: 2019-02-25

## 2019-02-25 ENCOUNTER — PATIENT MESSAGE (OUTPATIENT)
Dept: SURGERY | Facility: HOSPITAL | Age: 64
End: 2019-02-25

## 2019-02-25 VITALS
HEIGHT: 72 IN | HEART RATE: 88 BPM | BODY MASS INDEX: 31.02 KG/M2 | DIASTOLIC BLOOD PRESSURE: 80 MMHG | SYSTOLIC BLOOD PRESSURE: 132 MMHG | WEIGHT: 229 LBS

## 2019-02-25 DIAGNOSIS — G96.810 INTRACRANIAL HYPOTENSION: Primary | ICD-10-CM

## 2019-02-25 DIAGNOSIS — G96.01 CSF RHINORRHEA: ICD-10-CM

## 2019-02-25 PROCEDURE — 3008F PR BODY MASS INDEX (BMI) DOCUMENTED: ICD-10-PCS | Mod: CPTII,S$GLB,, | Performed by: NEUROLOGICAL SURGERY

## 2019-02-25 PROCEDURE — 99999 PR PBB SHADOW E&M-EST. PATIENT-LVL IV: CPT | Mod: PBBFAC,,, | Performed by: NEUROLOGICAL SURGERY

## 2019-02-25 PROCEDURE — 99204 OFFICE O/P NEW MOD 45 MIN: CPT | Mod: S$GLB,,, | Performed by: NEUROLOGICAL SURGERY

## 2019-02-25 PROCEDURE — 3008F BODY MASS INDEX DOCD: CPT | Mod: CPTII,S$GLB,, | Performed by: NEUROLOGICAL SURGERY

## 2019-02-25 PROCEDURE — 99204 PR OFFICE/OUTPT VISIT, NEW, LEVL IV, 45-59 MIN: ICD-10-PCS | Mod: S$GLB,,, | Performed by: NEUROLOGICAL SURGERY

## 2019-02-25 PROCEDURE — 99999 PR PBB SHADOW E&M-EST. PATIENT-LVL IV: ICD-10-PCS | Mod: PBBFAC,,, | Performed by: NEUROLOGICAL SURGERY

## 2019-02-25 RX ORDER — IVERMECTIN 10 MG/G
CREAM TOPICAL
COMMUNITY
Start: 2019-01-29 | End: 2023-08-15

## 2019-02-25 RX ORDER — DOXYCYCLINE HYCLATE 50 MG/1
CAPSULE ORAL
COMMUNITY
End: 2019-02-25

## 2019-02-25 RX ORDER — SPIRONOLACTONE 25 MG/1
TABLET ORAL
COMMUNITY
Start: 2019-01-23 | End: 2019-02-25

## 2019-02-25 NOTE — LETTER
February 25, 2019      Yusuf Daily MD  [address]  [facility] [address]           [facility] Fl  [address]  [facility] [address]  Phone: [phone]          Patient: Rena Del Valle   MR Number: [id number]   YOB: 1955   Date of Visit: 2/25/2019       Dear Dr. Yusuf Daily:    Thank you for referring Rena Del Valle to me for evaluation. Attached you will find relevant portions of my assessment and plan of care.    If you have questions, please do not hesitate to call me. I look forward to following Rena Del Valle along with you.    Sincerely,    Jim Sage MD    Enclosure  CC:  No Recipients    If you would like to receive this communication electronically, please contact [email or web address] or [phone] to request more information on [employer] Link access.    For providers and/or their staff who would like to refer a patient to [facility], please contact us through our one-stop-shop provider referral line, [facility] , at [phone].    If you feel you have received this communication in error or would no longer like to receive these types of communications, please e-mail [email or web address]

## 2019-02-25 NOTE — PROGRESS NOTES
This office note has been dictated.  Rena Del Valle was seen in neurosurgical consultation at the office this morning.    She is a 63-year-old lady who noted onset of frontal headache in October 2018.    Initially, this would be transient, lasting only several seconds, but quite   severe.  She could relate this to coughing, straining and head position and was   relieved by lying down.  This has persisted and become a daily event for her.    She avoids coughing, straining, blowing her nose or other exertion that might   bring on a headache.  She did note some associated moisture in her nose.  She   has not had actual fluid dripping from the nose.  She was seen on 02/01/19 by   Dr. Daily in ENT who performed a nasal endoscopy.  There was some question of a   little fluid in the right sphenoethmoid recess.  There has not been enough   fluid to collect for beta transferrin analysis.  Apparently in the past, she   rather suddenly lost her ability to smell.  This was evaluated in [place] without   a clear diagnosis.  The ability to perceive odor gradually recovered, but she   still has some deficit.  On one occasion last fall, she suffered a spontaneous   conjunctival hemorrhage, perhaps related to her headache.  She has had no visual   loss or double vision, no loss of hearing, no difficulty speaking or   swallowing, no weakness or numbness in extremities, difficulty with gait or   balance.  She was found to have a thyroid nodule, which was biopsied and felt to   be possibly precancerous.  She is scheduled with Dr. Barron for biopsy and   resection.  She has a history of hypertension.  This seems to be well managed.    She works as an .  She is .    On physical examination, she is a well-developed, well-nourished white lady who   is alert and cooperative.  Examination of the head shows no tenderness over the   scalp.  Eyes show full extraocular movements.  Pupils are equal and reactive to   light.  The pupils  are quite small.  The optic discs seem unremarkable.  She   feels her vision is stable.  Hearing is intact to finger rubbing bilaterally.    She is moving the neck freely.  On neurological examination, she is speaking   clearly, provides a good history.  Affect is unremarkable.  Finger-to-nose was   done well.  Gait is normal.  Cranial nerves are otherwise intact.  She has   normal facial sensation and movement.  The tongue protrudes in midline.  She   shows good strength in the extremities, normal sensation, somewhat hypoactive,   but symmetrical deep tendon reflexes.  No fluid was dripping from the nose   today.    CT scan of the sinuses was done at [facility] on 02/01/19.  There is no   fluid or inflammation in the sinuses.  No apparent defects.  MRI of the brain   done at [facility] on 02/13/19 shows normal ventricular size.  There are no   intrinsic or extrinsic mass lesions.  It was felt that the dural sinuses were   somewhat full, although there is no tonsillar herniation or other clear evidence   for intracranial hypotension.  There are minor microvascular changes.    IMPRESSION:  Headache, possible intracranial hypotension.    RECOMMENDATION:  There is no clear evidence for cerebrospinal leakage from the   nose.  If she can collect some fluid, this can be submitted for beta transferrin   analysis.  I will order a T2 space study of the head and T2 studies of the   spine to see if a spinal fluid leak can be detected.  She is to have thyroid   surgery.  I will see her back in a month or so.      RDS/HN  dd: 02/25/2019 10:12:18 (CST)  td: 02/26/2019 03:57:39 (CST)  Doc ID   #[id number]  Job ID #[id number]    CC: Rena Del Valle

## 2019-02-26 ENCOUNTER — TELEPHONE (OUTPATIENT)
Dept: OTOLARYNGOLOGY | Facility: CLINIC | Age: 64
End: 2019-02-26

## 2019-02-27 ENCOUNTER — ANESTHESIA (OUTPATIENT)
Dept: SURGERY | Facility: HOSPITAL | Age: 64
End: 2019-02-27
Payer: COMMERCIAL

## 2019-02-27 ENCOUNTER — HOSPITAL ENCOUNTER (OUTPATIENT)
Facility: HOSPITAL | Age: 64
Discharge: HOME | End: 2019-02-27
Attending: OTOLARYNGOLOGY | Admitting: OTOLARYNGOLOGY
Payer: COMMERCIAL

## 2019-02-27 VITALS
OXYGEN SATURATION: 95 % | HEIGHT: 72 IN | RESPIRATION RATE: 16 BRPM | SYSTOLIC BLOOD PRESSURE: 139 MMHG | DIASTOLIC BLOOD PRESSURE: 70 MMHG | WEIGHT: 230 LBS | BODY MASS INDEX: 31.15 KG/M2 | TEMPERATURE: 99 F | HEART RATE: 82 BPM

## 2019-02-27 DIAGNOSIS — E04.1 LEFT THYROID NODULE: ICD-10-CM

## 2019-02-27 DIAGNOSIS — E04.1 NONTOXIC THYROID NODULE: Primary | ICD-10-CM

## 2019-02-27 PROCEDURE — 27201423 OPTIME MED/SURG SUP & DEVICES STERILE SUPPLY: Performed by: OTOLARYNGOLOGY

## 2019-02-27 PROCEDURE — D9220A PRA ANESTHESIA: Mod: ANES,,, | Performed by: ANESTHESIOLOGY

## 2019-02-27 PROCEDURE — D9220A PRA ANESTHESIA: ICD-10-PCS | Mod: CRNA,,, | Performed by: NURSE ANESTHETIST, CERTIFIED REGISTERED

## 2019-02-27 PROCEDURE — 36000707: Performed by: OTOLARYNGOLOGY

## 2019-02-27 PROCEDURE — 37000008 HC ANESTHESIA 1ST 15 MINUTES: Performed by: OTOLARYNGOLOGY

## 2019-02-27 PROCEDURE — D9220A PRA ANESTHESIA: ICD-10-PCS | Mod: ANES,,, | Performed by: ANESTHESIOLOGY

## 2019-02-27 PROCEDURE — 88307 TISSUE EXAM BY PATHOLOGIST: CPT | Mod: 26,,, | Performed by: PATHOLOGY

## 2019-02-27 PROCEDURE — 25000003 PHARM REV CODE 250: Performed by: NURSE ANESTHETIST, CERTIFIED REGISTERED

## 2019-02-27 PROCEDURE — 36000706: Performed by: OTOLARYNGOLOGY

## 2019-02-27 PROCEDURE — 71000015 HC POSTOP RECOV 1ST HR: Performed by: OTOLARYNGOLOGY

## 2019-02-27 PROCEDURE — 88331 PATH CONSLTJ SURG 1 BLK 1SPC: CPT | Mod: 26,,, | Performed by: PATHOLOGY

## 2019-02-27 PROCEDURE — 88331 TISSUE SPECIMEN TO PATHOLOGY - SURGERY: ICD-10-PCS | Mod: 26,,, | Performed by: PATHOLOGY

## 2019-02-27 PROCEDURE — 88305 TISSUE EXAM BY PATHOLOGIST: CPT | Performed by: PATHOLOGY

## 2019-02-27 PROCEDURE — 25000003 PHARM REV CODE 250: Performed by: OTOLARYNGOLOGY

## 2019-02-27 PROCEDURE — 71000033 HC RECOVERY, INTIAL HOUR: Performed by: OTOLARYNGOLOGY

## 2019-02-27 PROCEDURE — D9220A PRA ANESTHESIA: Mod: CRNA,,, | Performed by: NURSE ANESTHETIST, CERTIFIED REGISTERED

## 2019-02-27 PROCEDURE — 60220 PARTIAL REMOVAL OF THYROID: CPT | Mod: LT,,, | Performed by: OTOLARYNGOLOGY

## 2019-02-27 PROCEDURE — 88305 TISSUE SPECIMEN TO PATHOLOGY - SURGERY: ICD-10-PCS | Mod: 26,,, | Performed by: PATHOLOGY

## 2019-02-27 PROCEDURE — 37000009 HC ANESTHESIA EA ADD 15 MINS: Performed by: OTOLARYNGOLOGY

## 2019-02-27 PROCEDURE — 88305 TISSUE EXAM BY PATHOLOGIST: CPT | Mod: 26,,, | Performed by: PATHOLOGY

## 2019-02-27 PROCEDURE — 60220 PR THYROID LOBECTOMY,UNILAT: ICD-10-PCS | Mod: LT,,, | Performed by: OTOLARYNGOLOGY

## 2019-02-27 PROCEDURE — 88307 TISSUE SPECIMEN TO PATHOLOGY - SURGERY: ICD-10-PCS | Mod: 26,,, | Performed by: PATHOLOGY

## 2019-02-27 PROCEDURE — 38510 PR BIOPSY/REM LYMPH NODES, CERVICAL: ICD-10-PCS | Mod: 51,LT,, | Performed by: OTOLARYNGOLOGY

## 2019-02-27 PROCEDURE — 63600175 PHARM REV CODE 636 W HCPCS: Performed by: NURSE ANESTHETIST, CERTIFIED REGISTERED

## 2019-02-27 PROCEDURE — 38510 BIOPSY/REMOVAL LYMPH NODES: CPT | Mod: 51,LT,, | Performed by: OTOLARYNGOLOGY

## 2019-02-27 RX ORDER — PHENYLEPHRINE HYDROCHLORIDE 10 MG/ML
INJECTION INTRAVENOUS
Status: DISCONTINUED | OUTPATIENT
Start: 2019-02-27 | End: 2019-02-27

## 2019-02-27 RX ORDER — BENZONATATE 200 MG/1
200 CAPSULE ORAL 3 TIMES DAILY PRN
Qty: 30 CAPSULE | Refills: 0 | Status: SHIPPED | OUTPATIENT
Start: 2019-02-27 | End: 2019-03-09

## 2019-02-27 RX ORDER — SUCCINYLCHOLINE CHLORIDE 20 MG/ML
INJECTION INTRAMUSCULAR; INTRAVENOUS
Status: DISCONTINUED | OUTPATIENT
Start: 2019-02-27 | End: 2019-02-27

## 2019-02-27 RX ORDER — LIDOCAINE HYDROCHLORIDE 10 MG/ML
1 INJECTION, SOLUTION EPIDURAL; INFILTRATION; INTRACAUDAL; PERINEURAL ONCE
Status: DISCONTINUED | OUTPATIENT
Start: 2019-02-27 | End: 2019-02-27 | Stop reason: HOSPADM

## 2019-02-27 RX ORDER — ONDANSETRON 2 MG/ML
INJECTION INTRAMUSCULAR; INTRAVENOUS
Status: DISCONTINUED | OUTPATIENT
Start: 2019-02-27 | End: 2019-02-27

## 2019-02-27 RX ORDER — LIDOCAINE HYDROCHLORIDE AND EPINEPHRINE 10; 10 MG/ML; UG/ML
INJECTION, SOLUTION INFILTRATION; PERINEURAL
Status: DISCONTINUED | OUTPATIENT
Start: 2019-02-27 | End: 2019-02-27 | Stop reason: HOSPADM

## 2019-02-27 RX ORDER — MIDAZOLAM HYDROCHLORIDE 1 MG/ML
INJECTION, SOLUTION INTRAMUSCULAR; INTRAVENOUS
Status: DISCONTINUED | OUTPATIENT
Start: 2019-02-27 | End: 2019-02-27

## 2019-02-27 RX ORDER — ACETAMINOPHEN 10 MG/ML
INJECTION, SOLUTION INTRAVENOUS
Status: DISCONTINUED | OUTPATIENT
Start: 2019-02-27 | End: 2019-02-27

## 2019-02-27 RX ORDER — HYDROCODONE BITARTRATE AND ACETAMINOPHEN 7.5; 325 MG/1; MG/1
1 TABLET ORAL EVERY 6 HOURS PRN
Qty: 30 TABLET | Refills: 0 | Status: SHIPPED | OUTPATIENT
Start: 2019-02-27 | End: 2019-03-27 | Stop reason: CLARIF

## 2019-02-27 RX ORDER — PROPOFOL 10 MG/ML
VIAL (ML) INTRAVENOUS
Status: DISCONTINUED | OUTPATIENT
Start: 2019-02-27 | End: 2019-02-27

## 2019-02-27 RX ORDER — DEXAMETHASONE SODIUM PHOSPHATE 4 MG/ML
INJECTION, SOLUTION INTRA-ARTICULAR; INTRALESIONAL; INTRAMUSCULAR; INTRAVENOUS; SOFT TISSUE
Status: DISCONTINUED | OUTPATIENT
Start: 2019-02-27 | End: 2019-02-27

## 2019-02-27 RX ORDER — ACETAMINOPHEN 500 MG
500 TABLET ORAL EVERY 6 HOURS PRN
COMMUNITY
End: 2019-04-08

## 2019-02-27 RX ORDER — LIDOCAINE HCL/PF 100 MG/5ML
SYRINGE (ML) INTRAVENOUS
Status: DISCONTINUED | OUTPATIENT
Start: 2019-02-27 | End: 2019-02-27

## 2019-02-27 RX ORDER — LORATADINE 10 MG/1
10 TABLET ORAL DAILY
COMMUNITY
End: 2019-03-27 | Stop reason: CLARIF

## 2019-02-27 RX ORDER — LIDOCAINE HYDROCHLORIDE 10 MG/ML
1 INJECTION, SOLUTION EPIDURAL; INFILTRATION; INTRACAUDAL; PERINEURAL ONCE
Status: DISCONTINUED | OUTPATIENT
Start: 2019-02-27 | End: 2019-02-27 | Stop reason: SDUPTHER

## 2019-02-27 RX ORDER — CEFAZOLIN SODIUM 1 G/3ML
INJECTION, POWDER, FOR SOLUTION INTRAMUSCULAR; INTRAVENOUS
Status: DISCONTINUED | OUTPATIENT
Start: 2019-02-27 | End: 2019-02-27

## 2019-02-27 RX ORDER — FENTANYL CITRATE 50 UG/ML
INJECTION, SOLUTION INTRAMUSCULAR; INTRAVENOUS
Status: DISCONTINUED | OUTPATIENT
Start: 2019-02-27 | End: 2019-02-27

## 2019-02-27 RX ORDER — ONDANSETRON 8 MG/1
8 TABLET, ORALLY DISINTEGRATING ORAL EVERY 8 HOURS PRN
Qty: 15 TABLET | Refills: 0 | Status: SHIPPED | OUTPATIENT
Start: 2019-02-27 | End: 2019-03-27 | Stop reason: CLARIF

## 2019-02-27 RX ORDER — CHOLECALCIFEROL (VITAMIN D3) 125 MCG
CAPSULE ORAL
COMMUNITY
End: 2019-04-08

## 2019-02-27 RX ORDER — CEFAZOLIN SODIUM 1 G/3ML
2 INJECTION, POWDER, FOR SOLUTION INTRAMUSCULAR; INTRAVENOUS
Status: DISCONTINUED | OUTPATIENT
Start: 2019-02-27 | End: 2019-02-27 | Stop reason: HOSPADM

## 2019-02-27 RX ORDER — ROCURONIUM BROMIDE 10 MG/ML
INJECTION, SOLUTION INTRAVENOUS
Status: DISCONTINUED | OUTPATIENT
Start: 2019-02-27 | End: 2019-02-27

## 2019-02-27 RX ORDER — CEFAZOLIN SODIUM 1 G/3ML
2 INJECTION, POWDER, FOR SOLUTION INTRAMUSCULAR; INTRAVENOUS ONCE
Status: DISCONTINUED | OUTPATIENT
Start: 2019-02-27 | End: 2019-02-27 | Stop reason: SDUPTHER

## 2019-02-27 RX ORDER — DEXAMETHASONE SODIUM PHOSPHATE 4 MG/ML
12 INJECTION, SOLUTION INTRA-ARTICULAR; INTRALESIONAL; INTRAMUSCULAR; INTRAVENOUS; SOFT TISSUE ONCE
Status: DISCONTINUED | OUTPATIENT
Start: 2019-02-27 | End: 2019-02-27 | Stop reason: HOSPADM

## 2019-02-27 RX ORDER — SODIUM CHLORIDE 9 MG/ML
INJECTION, SOLUTION INTRAVENOUS CONTINUOUS
Status: DISCONTINUED | OUTPATIENT
Start: 2019-02-27 | End: 2019-02-27 | Stop reason: HOSPADM

## 2019-02-27 RX ADMIN — PROPOFOL 200 MG: 10 INJECTION, EMULSION INTRAVENOUS at 03:02

## 2019-02-27 RX ADMIN — MIDAZOLAM HYDROCHLORIDE 2 MG: 1 INJECTION, SOLUTION INTRAMUSCULAR; INTRAVENOUS at 03:02

## 2019-02-27 RX ADMIN — Medication 5 MG: at 03:02

## 2019-02-27 RX ADMIN — PHENYLEPHRINE HYDROCHLORIDE 100 MCG: 10 INJECTION INTRAVENOUS at 04:02

## 2019-02-27 RX ADMIN — FENTANYL CITRATE 25 MCG: 50 INJECTION, SOLUTION INTRAMUSCULAR; INTRAVENOUS at 04:02

## 2019-02-27 RX ADMIN — FENTANYL CITRATE 100 MCG: 50 INJECTION, SOLUTION INTRAMUSCULAR; INTRAVENOUS at 03:02

## 2019-02-27 RX ADMIN — SODIUM CHLORIDE: 0.9 INJECTION, SOLUTION INTRAVENOUS at 11:02

## 2019-02-27 RX ADMIN — CEFAZOLIN 2 G: 330 INJECTION, POWDER, FOR SOLUTION INTRAMUSCULAR; INTRAVENOUS at 03:02

## 2019-02-27 RX ADMIN — Medication 10 MG: at 04:02

## 2019-02-27 RX ADMIN — PHENYLEPHRINE HYDROCHLORIDE 100 MCG: 10 INJECTION INTRAVENOUS at 03:02

## 2019-02-27 RX ADMIN — SODIUM CHLORIDE: 0.9 INJECTION, SOLUTION INTRAVENOUS at 04:02

## 2019-02-27 RX ADMIN — DEXAMETHASONE SODIUM PHOSPHATE 8 MG: 4 INJECTION, SOLUTION INTRAMUSCULAR; INTRAVENOUS at 03:02

## 2019-02-27 RX ADMIN — ROCURONIUM BROMIDE 5 MG: 10 INJECTION, SOLUTION INTRAVENOUS at 03:02

## 2019-02-27 RX ADMIN — ACETAMINOPHEN 1000 MG: 10 INJECTION, SOLUTION INTRAVENOUS at 03:02

## 2019-02-27 RX ADMIN — SUCCINYLCHOLINE CHLORIDE 120 MG: 20 INJECTION, SOLUTION INTRAMUSCULAR; INTRAVENOUS at 03:02

## 2019-02-27 RX ADMIN — ONDANSETRON 4 MG: 2 INJECTION INTRAMUSCULAR; INTRAVENOUS at 04:02

## 2019-02-27 RX ADMIN — FENTANYL CITRATE 25 MCG: 50 INJECTION, SOLUTION INTRAMUSCULAR; INTRAVENOUS at 03:02

## 2019-02-27 RX ADMIN — LIDOCAINE HYDROCHLORIDE 50 MG: 20 INJECTION, SOLUTION INTRAVENOUS at 03:02

## 2019-02-27 NOTE — PROGRESS NOTES
"Dr peralta and resident with dr adame notified of pt stating she has a "post nasal drip" cough, temps of 99.0-99.2, "scratchy throat", no new orders at this time  "

## 2019-02-27 NOTE — DISCHARGE INSTRUCTIONS
You may shower.   Take stool softeners to avoid straining after surgery.   Light activity. No stooping, straining, or lifting > 10 lb. No strenuous exercise, but walking is encouraged.  Resume regular diet.   Follow up in ENT clinic as instructed by Dr. Barron.

## 2019-02-27 NOTE — PLAN OF CARE
Pt prepped for sx, vss, nad, family at bedside. Resident w dr adame notified of pt taking aspirin three days ago, aleve last night, no new orders

## 2019-02-27 NOTE — BRIEF OP NOTE
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 Left paratracheal lymph node - frozen  - sent2. Left Thyroid lobe isthmus and paratracheal lymph nodes, stitch marks superior fold - permanent - fridge     Start Status   02/27/19 1718 Collected (02/27/19 1713)       02/27/19 1728          Discharge Note    SUMMARY     Admit Date: 2/27/2019    Discharge Date and Time:  02/27/2019 5:35 PM    Hospital Course (synopsis of major diagnoses, care, treatment, and services provided during the course of the hospital stay): 63 y.o. female admitted for above procedures, tolerated well. Patient was transferred to PACU for recovery. After an appropriate period under direct observation, patient will be discharged home with pain medication, zofran.       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     HYDROcodone-acetaminophen 7.5-325 mg per tablet  Commonly known as:  NORCO  Take 1 tablet by mouth every 6 (six) hours as needed for Pain.     ondansetron 8 MG  Tbdl  Commonly known as:  ZOFRAN-ODT  Take 1 tablet (8 mg total) by mouth every 8 (eight) hours as needed (nausea).        CONTINUE taking these medications    acetaminophen 500 MG tablet  Commonly known as:  TYLENOL  Take 500 mg by mouth every 6 (six) hours as needed for Pain.     ALEVE 220 mg Cap  Generic drug:  naproxen sodium  Take by mouth.     aspirin 81 MG EC tablet  Commonly known as:  ECOTRIN  Take 81 mg by mouth 4 (four) times a week.     candesartan 16 MG tablet  Commonly known as:  ATACAND  Take 16 mg by mouth once daily.     doxycycline 50 MG capsule  Commonly known as:  VIBRAMYCIN     estradiol 0.05 mg/24 hr td ptsw 0.05 mg/24 hr  Commonly known as:  VIVELLE-DOT     loratadine 10 mg tablet  Commonly known as:  CLARITIN  Take 10 mg by mouth once daily.     pantoprazole 40 MG tablet  Commonly known as:  PROTONIX     progesterone 100 MG capsule  Commonly known as:  PROMETRIUM     SOOLANTRA 1 % Crea  Generic drug:  ivermectin     spironolactone-hydrochlorothiazide 50-50 mg per tablet  Commonly known as:  ALDACTAZIDE  Take 1 tablet by mouth every Mon, Wed, Fri.          Discharge Procedure Orders   Diet Adult Regular     Lifting restrictions   Order Comments: Light activity. No stooping, straining, or lifting > 10 lb. No strenuous exercise, but walking is encouraged.  May shower.     Notify your health care provider if you experience any of the following:  persistent dizziness, light-headedness, or visual disturbances     Notify your health care provider if you experience any of the following:  increased confusion or weakness     Notify your health care provider if you experience any of the following:  difficulty breathing or increased cough     Notify your health care provider if you experience any of the following:  redness, tenderness, or signs of infection (pain, swelling, redness, odor or green/yellow discharge around incision site)     Notify your health care provider if you experience any of the  following:  severe uncontrolled pain     Notify your health care provider if you experience any of the following:  persistent nausea and vomiting or diarrhea     Notify your health care provider if you experience any of the following:  severe persistent headache     Notify your health care provider if you experience any of the following:   Order Comments: Neck swelling or redness, trouble breathing or hoarseness of voice     No dressing needed

## 2019-02-27 NOTE — H&P
HEAD AND NECK SURGICAL ONCOLOGY CLINIC     Subjective:       Patient ID: Rena Del Valle is a 63 y.o. female.     Chief Complaint: Consult (thyroid nodule)     INTERVAL HPI  Rena Del Valle is a 63 y.o. female who presents for planned thyroid lobectomy. She was last seen in clinic in mid-January by Dr. Barron for evaluation of a several week history of a central neck mass/goiter. Her HPI from clinic is as noted below with no interval changes:     She has not noticed a central mass in the neck. Her saga started in the fall when she had routine blood work that revealed elevated TPO antibodies.  She remained euthyroid, however.  She had an ultrasound and then an FNA at that time. She has left-sided nodules evident on ultrasound, with a dominant nodule evident on the left.      The cytology from this FNA revealed atypia of uncertain significance, so the Affirma test was performed, which in turn place the risk of malignancy around 50%.  She had been working with the general surgeon on the [place] and then decided to seek some other opinions.  She has family members that work at [facility] as well as here, so she presents for an opinion.  Other than subjectively being aware of the mass, she is predominantly asymptomatic.     Recently, She has had some compressive symptoms such as a globus sensation and mild dysphagia. She thinks that her voice is occasionally hoarse, but mostly normal. She denies odynophagia, throat pain, and otalgia. She is a non-smoker. She denies cough and throat clearing. There is not hemoptysis or hematemesis. She does not  experience shortness of breath occasionally. There is no family history of thyroid disease or cancer. There is no personal history of radiation exposure or treatment to the head and neck region.          Past Medical History:   Diagnosis Date    Breast cyst      GERD (gastroesophageal reflux disease)      Nontoxic thyroid nodule 1/27/2019               Past Surgical History:    Procedure Laterality Date    BREAST BIOPSY         pt does not remember    SHOULDER SURGERY Right              Current Outpatient Medications:     candesartan (ATACAND) 16 MG tablet, Take 16 mg by mouth once daily., Disp: , Rfl:     doxycycline (VIBRAMYCIN) 50 MG capsule, , Disp: , Rfl:     estradiol 0.05 mg/24 hr td ptsw (VIVELLE-DOT) 0.05 mg/24 hr, , Disp: , Rfl:     pantoprazole (PROTONIX) 40 MG tablet, , Disp: , Rfl:     progesterone (PROMETRIUM) 100 MG capsule, , Disp: , Rfl:     ranitidine (ZANTAC) 300 MG tablet, , Disp: , Rfl:     spironolactone-hydrochlorothiazide (ALDACTAZIDE) 50-50 mg per tablet, Take 1 tablet by mouth once daily., Disp: , Rfl:     triamcinolone acetonide 0.1% (KENALOG) 0.1 % cream, , Disp: , Rfl:     valsartan (DIOVAN) 80 MG tablet, , Disp: , Rfl:           Review of patient's allergies indicates:   Allergen Reactions    Ciprofloxacin           Social History               Socioeconomic History    Marital status:        Spouse name: Not on file    Number of children: Not on file    Years of education: Not on file    Highest education level: Not on file   Social Needs    Financial resource strain: Not on file    Food insecurity - worry: Not on file    Food insecurity - inability: Not on file    Transportation needs - medical: Not on file    Transportation needs - non-medical: Not on file   Occupational History    Not on file   Tobacco Use    Smoking status: Never Smoker    Smokeless tobacco: Never Used   Substance and Sexual Activity    Alcohol use: No    Drug use: Not on file    Sexual activity: Not on file   Other Topics Concern    Not on file   Social History Narrative    Not on file                  Family History   Problem Relation Age of Onset    Breast cancer Maternal Aunt           60's    Hypertension Mother      Hypertension Father      Stroke Father           Review of Systems   Constitutional: Negative for activity change, appetite  change, chills, fatigue, fever and unexpected weight change.   HENT: Positive for trouble swallowing. Negative for congestion, dental problem, drooling, ear discharge, ear pain, facial swelling, hearing loss, mouth sores, nosebleeds, postnasal drip, rhinorrhea, sinus pressure, sneezing, sore throat, tinnitus and voice change.    Eyes: Negative for pain and visual disturbance.   Respiratory: Negative for cough, choking and shortness of breath.    Cardiovascular: Negative for chest pain, palpitations and leg swelling.   Gastrointestinal: Negative for abdominal pain, constipation, diarrhea, nausea and vomiting.   Endocrine: Negative for cold intolerance and heat intolerance.   Genitourinary: Negative for difficulty urinating, dysuria and hematuria.   Musculoskeletal: Negative for arthralgias, neck pain and neck stiffness.   Skin: Negative for rash and wound.   Allergic/Immunologic: Negative for environmental allergies and immunocompromised state.   Neurological: Negative for dizziness, facial asymmetry, speech difficulty, weakness, light-headedness, numbness and headaches.   Hematological: Negative for adenopathy. Does not bruise/bleed easily.   Psychiatric/Behavioral: Negative for dysphoric mood. The patient is not nervous/anxious.        Objective:     Physical Exam   Constitutional: She is oriented to person, place, and time. She appears well-developed and well-nourished. No distress.   HENT:   Head: Normocephalic and atraumatic.       Right Ear: Hearing, tympanic membrane, external ear and ear canal normal.   Left Ear: Hearing, tympanic membrane, external ear and ear canal normal.   Nose: No rhinorrhea, nasal deformity or septal deviation. No epistaxis. Right sinus exhibits no maxillary sinus tenderness and no frontal sinus tenderness. Left sinus exhibits no maxillary sinus tenderness and no frontal sinus tenderness.   Mouth/Throat: Uvula is midline, oropharynx is clear and moist and mucous membranes are normal.  She does not have dentures. No oral lesions. No trismus in the jaw. Normal dentition. No dental caries. No oropharyngeal exudate or posterior oropharyngeal edema.   NP: No lesions of posterior wall  OP: No lesions of posterior wall or BOT. BOT is soft to palpation  Larynx: No lesions of glottic or supraglottic larynx. Normal vocal fold mobility    HP: No lesions of pyriform sinuses or postcricoid region  Mirror examination was performed.     Eyes: EOM and lids are normal. Pupils are equal, round, and reactive to light. Right eye exhibits no chemosis. Left eye exhibits no chemosis. No scleral icterus.   Neck: Trachea normal, normal range of motion, full passive range of motion without pain and phonation normal. No muscular tenderness present. Carotid bruit is not present. No thyroid mass and no thyromegaly present.   Cardiovascular: Normal rate, regular rhythm and intact distal pulses.   Pulmonary/Chest: Effort normal. No accessory muscle usage or stridor. No respiratory distress.   Abdominal: Normal appearance. There is no tenderness.   Musculoskeletal:        Right shoulder: She exhibits normal range of motion and no deformity.        Left shoulder: She exhibits normal range of motion and no deformity.   Lymphadenopathy:        Head (right side): No submental and no occipital adenopathy present.        Head (left side): No submental and no occipital adenopathy present.     She has no cervical adenopathy.        Right cervical: No deep cervical and no posterior cervical adenopathy present.       Left cervical: No deep cervical and no posterior cervical adenopathy present.   Neurological: She is alert and oriented to person, place, and time. No cranial nerve deficit or sensory deficit. Gait normal.   Skin: Skin is warm and intact. No lesion and no rash noted.   Psychiatric: She has a normal mood and affect. Her speech is normal and behavior is normal. Thought content normal.   Vitals reviewed.       Thyroid US  10/30/2018:  FINDINGS:  The right lobe of the thyroid measures 4.5 x 1.2 x 1.2 cm with a heterogeneous echotexture.  No significant increased vascularity to the right lobe of the thyroid.    Isthmus measures approximately 3 mm with heterogeneous echotexture.  No significant soft tissue nodules noted.    The left lobe of the thyroid measures 4.6 x 1.6 x 1.5 cm with heterogeneous echogenicity.  In the lower pole of the left lobe there is a solid mostly hypoechoic nodule with slight increased peripheral vascularity measuring approximately 2.3 x 1.4 x 1.4 cm.  In the upper pole no significant soft tissue masses are visualized.       Assessment & Plan:            Problem List Items Addressed This Visit           Nontoxic thyroid nodule        Ms. Del Valle has a 2.3cm in maximum dimension left thyroid nodule in the inferior pole. This was shown to be AUS on FNA, and subsequent Affirma testing placed its risk of malignancy at 50%, which is consistent with the risk for such a histopathologic report.  She has a multitude of questions about thyroid disease, thyroid surgery, and, importantly, her options.  I spent over an hour discussing these and subsequent questions with her and her family, and all questions were answered to their apparent satisfaction.     We discussed how common thyroid nodules are in the general population. However, I explained that the vast majority of thyroid nodules are not malignant and that there are certain steps that we have to take to evaluate thyroid nodules for malignancy.   I then discussed with the patient the potential outcomes of the fine-needle aspiration in broad terms: Benign, malignant or suspicious, AUS/FLUS/follicular lesion/suspicious for follicular neoplasm (which I described as suspicious for being suspicious, suspicious for being suspicious for being suspicious, and suspicious for being suspicious for being suspicious for being suspicious), or indeterminate.  Recommendations for  treatment are predicated on this result.  In general benign lesions can be followed with intermittent ultrasound.  My recommendation for malignant or suspicious lesions is total thyroidectomy, potentially with radioactive iodine remnant ablation in the setting of malignancy.  Follicular lesions merit thyroid lobectomy or total thyroidectomy, depending on the specific clinical picture, with a stronger recommendation for surgery when atypia of uncertain significance is encountered.  Similarly, either repeat ultrasound-guided needle biopsy or surgery is a reasonable course of action for the indeterminate biopsy result, again depending on the specific clinical picture.       I offered the patient 3 options: observation with repeat ultrasound 6 months, left thyroid lobectomy, and total thyroidectomy.   We discussed the relative risks and benefits of each approach. Although surgery may be avoided with observation, there is a chance that malignancy could go undetected, allowing for potential growth or even metastasis. With her AUS report, I am not comfortable with recommending this, but would closely follow her in a shared decision making environment if that is what she wanted.  A thyroid lobectomy limits the surgical risk and offers a roughly 40-50% chance that she will not require synthroid replacement therapy - except in the setting of Hashimoto's thyroiditis, which she has. If malignancy is detected on permanent section, then a second procedure would be required within 2-4 weeks for completion thyroidectomy. Although there is no difference in the risk of surgical complications with delayed total thyroidectomy versus immediate total thyroidectomy, that would require a second anesthetic. Frozen section analysis could be accomplished, but there are limitations on its accuracy in certain conditions, particularly follicular thyroid neoplasms. Finally, a total thyroidectomy could be accomplished, which would limit the number  of procedures required, but the chance exists that no malignancy would be detected and the patient would now be obligated to synthroid replacement therapy for the rest of her life.  She is not interested in total thyroidectomy if it can be avoided.     As such,  because the nodule was read as AUS/50% risk in the setting of a single dominant nodule, I have recommended a left thyroid lobectomy and isthmusectomy, with potential total thyroidectomy if there are obvious signs of malignancy such as clinically abnormal lymph nodes.     After this, I explained the risks of thyroidectomy include, but are not limited to, infection, bleeding, scarring, failure to achieve the diagnosis, no evidence of cancer, recurrence, collection of blood or tissue fluid requiring drainage, injury to the recurrent laryngeal nerve with resultant temporary or permanent hoarseness (1% permanent risk with up to 10% temporary risk, greater in revision operations), injury to the superior laryngeal nerve with resultant loss of the upper register for singing or challenges with yelling, temporary or permanent hypocalcemia related to injury or devascularization of the parathyroid glands (less than 5% permanent, up to 30-60% when paratracheal dissection is accomplished, again greater in revision operations), and the need for additional procedures or therapies.  Time was allowed for questions, and all questions were answered to the patient's apparent satisfaction. The risks of paratracheal lymph node dissection are included above.      She is still contemplating a trip to [place] and will have her slides reviewed there.  By the time of note completion, I have included the MD Brown report for our records.  Her other records have been scanned into Epic.                    To OR for thyroid lobectomy (left). See detailed plan above, per Dr. Barron's clinic note.

## 2019-02-27 NOTE — TRANSFER OF CARE
Anesthesia Transfer of Care Note    Patient: Rena Del Valle    Procedure(s) Performed: Procedure(s) (LRB):  LOBECTOMY, THYROID (Left)    Patient location: PACU    Anesthesia Type: general    Transport from OR: Transported from OR on 2-3 L/min O2 by NC with adequate spontaneous ventilation    Post pain: adequate analgesia    Post assessment: no apparent anesthetic complications    Post vital signs: stable    Level of consciousness: awake, alert and oriented    Nausea/Vomiting: no nausea/vomiting    Complications: none    Transfer of care protocol was followed      Last vitals:   Visit Vitals  /77 (BP Location: Right arm, Patient Position: Lying)   Pulse 96   Temp 37.3 °C (99.2 °F) (Oral)   Resp 18   Ht 6' (1.829 m)   Wt 104.3 kg (230 lb)   SpO2 99%   Breastfeeding? No   BMI 31.19 kg/m²

## 2019-02-28 NOTE — ANESTHESIA POSTPROCEDURE EVALUATION
Anesthesia Post Evaluation    Patient: Rena Del Valle    Procedure(s) Performed: Procedure(s) (LRB):  LOBECTOMY, THYROID (Left)    Final Anesthesia Type: general  Patient location during evaluation: PACU  Patient participation: Yes- Able to Participate  Level of consciousness: awake and alert  Post-procedure vital signs: reviewed and stable  Pain management: adequate  Airway patency: patent  PONV status at discharge: No PONV  Anesthetic complications: no      Cardiovascular status: blood pressure returned to baseline  Respiratory status: unassisted  Hydration status: euvolemic  Follow-up not needed.        Visit Vitals  /70   Pulse 82   Temp 37.1 °C (98.8 °F) (Temporal)   Resp 16   Ht 6' (1.829 m)   Wt 104.3 kg (230 lb)   SpO2 95%   Breastfeeding? No   BMI 31.19 kg/m²       Pain/Sania Score: Sania Score: 10 (2/27/2019  5:46 PM)

## 2019-02-28 NOTE — PLAN OF CARE
Awake and alert. VSS. Denies pain or nausea. Tolerating liquids well. Voiding well. DC instructions given to patient and family and they verbalize understanding.

## 2019-03-04 NOTE — OP NOTE
Date of Operation: 2/27/2019    Surgeon: CICI MCCANN     Assistants: Antionette Louis (RES)    Anesthesia: General endotracheal anesthesia    ASA Class: 1    Preoperative Diagnosis:   1) Left thyroid nodule - atypia of uncertain significance  2) Hashimoto's thyroiditis    Postoperative diagnosis:  1) Left thyroid nodule - atypia of uncertain significance  2) Hashimoto's thyroiditis    Procedures performed:  1) Left thyroid lobectomy and isthmusectomy  2) Deep cervical lymph node biopsies    Closing Set: NA    Indications for operation:  Ms. Del Valle has a 2.3cm in maximum dimension left thyroid nodule in the inferior pole. This was shown to be AUS on FNA, and subsequent Affirma testing placed its risk of malignancy at 50%, which is consistent with the risk for such a histopathologic report. We discussed continued observation, total thyroidectomy, and left thyroidectomy with the potential for delayed completion thyroidectomy as indicated.    The risks, benefits, indications, and alternatives to this procedure were thoroughly discussed with the patient preoperatively, and informed consent was obtained. Please see my detailed preoperative notes for a thorough account of this discussion.    Findings:  Sclerotic left thyroid nodule with no pathologically enlarged lymph nodes (but numerous small, pale nodes consistent with the underlying thyroiditis - some were assessed via frozen section without evidence of malignancy). The left recurrent laryngeal nerve was identified and preserved, responding appropriately (responses > 300 microvolts) to stimulus as 0.8mA. The left superior and inferior   parathyroid glands were identified and preserved.     EBL: 20mL    IVF:  May be found in the operative record    Detailed Account of Technique Employed:  The patient was identified in the holding area per routine. She was transported to the Operating Room and placed supine on the operating table. She was intubated transorally with the  nerve monitoring endotracheal tube and an adequate level of general endotracheal anesthesia was achieved. Adequate functioning of the nerve monitoring tube was determined by positive responses when the patient became light with anesthesia, as there was evidence of neuromuscular activity, and with direct palpation of the thyroid ala, which gave an appropriate stimulus response on the side of palpation. The anterior neck and chest were then prepped and draped in the standard fashion. A timeout was performed according to the Universal protocol. A suitable skin crease located about 2 fingerbreadths above the clavicle was then marked and injected with 1% lidocaine with 1:100,000 epinephrine.     A 3.5 cm incision was then made within the skin crease through the skin and subcutaneous tissues. The platysma was divided laterally and the superficial layer of cervical fascia divided in the midline. Subplatysmal flaps were elevated to the thyroid notch superiorly and to the sternal notch inferiorly. The median raphe between the strap muscles was identified and . The straps were then elevated off the left thyroid lobe. The superior pole was identified. The cricothyroid space was developed. The superior pedicle was then doubly clamped, divided and ligated with 2-0 and 3-0 silk after visualizing branches of the external branch of the superior laryngeal nerve. The gland was rotated medially. Multiple fibrovascular attachments of the gland in the region of the middle thyroid vein were then either clipped or controlled with a bipolar. The gland was rotated even further medially. The tubercle of Zuckerkandl was identified, and the superior parathyroid gland was identified and swept laterally. The inferior parathyroid gland was then identified located superficial to the recurrent nerve and also swept laterally. Some adjacent, pale but visible lymph nodes were excised and sent for frozen section assessment, revealing no  carcinoma. Because of the number of nodes, the fibrofatty tissue adjacent to the thyroid, between the lobe and the left CCA and overlying the RLN, was excised, effectively accomplishing a paratracheal dissection. The recurrent nerve was identified coursing through the left tracheoesophagral groove, just deep to the tubercle, and it was dissected both distally and proximally towards the cricothyroid joint, dividing the intervening fibrovascular attachments of the gland to the central neck. There was moderate infiltration of Berry's ligament by thyroid tissue, and the ligament was divided over the recurrent nerve. The anterior fibrovascular attachments of the gland to the trachea were then divided with the bipolar and/or small surgical clips and it was elevated off the trachea. Distal branches of the inferior thyroid artery were controlled with either small surgical clips, 3-0 ties or the bipolar as the left lobe was rotated medially.     A small pyramidal lobe was dissected off the cricothyroid muscles, and the interface between the isthmus and the right lobe identified. This was clamped with a Geno and divided. The stump was oversewn with running horizontal mattress 2-0 silk and a running locking stitch coming back, with care taken to minimize inclusion of the right sternothyroid muscle in the stitch.    The gland was amputated and inspected ex-vivo. There was no parathyroidal tissue identified on the specimen. The left recurrent nerve was then stimulated with the nerve integrity monitor system at 0.8 milliampere of stimulus intensity as noted above.    The wound was copiously irrigated with saline and additional hemostasis was achieved as necessary with focal application of the bipolar cautery. Some Surgicel was placed in the paratracheal gutter. Multiple Valsalva maneuvers were performed as noted above. I did not close the straps. Interrupted 3-0 Vicryl was used in the platysma, followed by deep dermal buried  sutures of 4-0 Monocryl. Dermabond was applied to the skin. The patient was then allowed to awaken from anesthesia, extubated and transported to the Recovery Room in stable condition.     All sponge, needle and instrument counts were correct at the end of case x2.     I was present for and performed all portions of this operation, including final wound closure.

## 2019-03-11 ENCOUNTER — HOSPITAL ENCOUNTER (OUTPATIENT)
Dept: RADIOLOGY | Facility: HOSPITAL | Age: 64
Discharge: HOME | End: 2019-03-11
Attending: NEUROLOGICAL SURGERY
Payer: COMMERCIAL

## 2019-03-11 DIAGNOSIS — G96.01 CSF RHINORRHEA: ICD-10-CM

## 2019-03-11 DIAGNOSIS — G96.810 INTRACRANIAL HYPOTENSION: ICD-10-CM

## 2019-03-11 PROCEDURE — 72156 MRI NECK SPINE W/O & W/DYE: CPT | Mod: 26,,, | Performed by: RADIOLOGY

## 2019-03-11 PROCEDURE — 72158 MRI LUMBAR SPINE W/O & W/DYE: CPT | Mod: TC

## 2019-03-11 PROCEDURE — 70553 MRI BRAIN STEM W/O & W/DYE: CPT | Mod: TC

## 2019-03-11 PROCEDURE — A9585 GADOBUTROL INJECTION: HCPCS | Performed by: NEUROLOGICAL SURGERY

## 2019-03-11 PROCEDURE — 72156 MRI CERVICAL SPINE W WO CONTRAST: ICD-10-PCS | Mod: 26,,, | Performed by: RADIOLOGY

## 2019-03-11 PROCEDURE — 70553 MRI BRAIN W WO CONTRAST: ICD-10-PCS | Mod: 26,,, | Performed by: RADIOLOGY

## 2019-03-11 PROCEDURE — 72156 MRI NECK SPINE W/O & W/DYE: CPT | Mod: TC

## 2019-03-11 PROCEDURE — 72158 MRI LUMBAR SPINE W/O & W/DYE: CPT | Mod: 26,,, | Performed by: RADIOLOGY

## 2019-03-11 PROCEDURE — 72157 MRI CHEST SPINE W/O & W/DYE: CPT | Mod: TC

## 2019-03-11 PROCEDURE — 25500020 PHARM REV CODE 255: Performed by: NEUROLOGICAL SURGERY

## 2019-03-11 PROCEDURE — 72157 MRI THORACIC SPINE W WO CONTRAST: ICD-10-PCS | Mod: 26,,, | Performed by: RADIOLOGY

## 2019-03-11 PROCEDURE — 72158 MRI LUMBAR SPINE W WO CONTRAST: ICD-10-PCS | Mod: 26,,, | Performed by: RADIOLOGY

## 2019-03-11 PROCEDURE — 72157 MRI CHEST SPINE W/O & W/DYE: CPT | Mod: 26,,, | Performed by: RADIOLOGY

## 2019-03-11 PROCEDURE — 70553 MRI BRAIN STEM W/O & W/DYE: CPT | Mod: 26,,, | Performed by: RADIOLOGY

## 2019-03-11 RX ORDER — GADOBUTROL 604.72 MG/ML
10 INJECTION INTRAVENOUS
Status: COMPLETED | OUTPATIENT
Start: 2019-03-11 | End: 2019-03-11

## 2019-03-11 RX ADMIN — GADOBUTROL 10 ML: 604.72 INJECTION INTRAVENOUS at 03:03

## 2019-03-12 ENCOUNTER — TELEPHONE (OUTPATIENT)
Dept: OTOLARYNGOLOGY | Facility: CLINIC | Age: 64
End: 2019-03-12

## 2019-03-14 ENCOUNTER — OFFICE VISIT (OUTPATIENT)
Dept: OTOLARYNGOLOGY | Facility: CLINIC | Age: 64
End: 2019-03-14
Payer: COMMERCIAL

## 2019-03-14 VITALS
WEIGHT: 233 LBS | SYSTOLIC BLOOD PRESSURE: 148 MMHG | TEMPERATURE: 98 F | HEART RATE: 84 BPM | DIASTOLIC BLOOD PRESSURE: 72 MMHG | BODY MASS INDEX: 31.6 KG/M2

## 2019-03-14 DIAGNOSIS — C73 FOLLICULAR CARCINOMA OF THYROID: ICD-10-CM

## 2019-03-14 PROCEDURE — 99999 PR PBB SHADOW E&M-EST. PATIENT-LVL IV: ICD-10-PCS | Mod: PBBFAC,,, | Performed by: NURSE PRACTITIONER

## 2019-03-14 PROCEDURE — 99999 PR PBB SHADOW E&M-EST. PATIENT-LVL IV: CPT | Mod: PBBFAC,,, | Performed by: NURSE PRACTITIONER

## 2019-03-14 PROCEDURE — 99024 POSTOP FOLLOW-UP VISIT: CPT | Mod: S$GLB,,, | Performed by: NURSE PRACTITIONER

## 2019-03-14 PROCEDURE — 99024 PR POST-OP FOLLOW-UP VISIT: ICD-10-PCS | Mod: S$GLB,,, | Performed by: NURSE PRACTITIONER

## 2019-03-14 NOTE — H&P (VIEW-ONLY)
HEAD AND NECK SURGICAL ONCOLOGY CLINIC    Subjective:       Patient ID: Rena Del Valle is a 63 y.o. female.    Chief Complaint: Post-op Evaluation/ can't talk loud    HPI  Rena Del Valle is a 63 y.o. female who returns for a post op visit. She has been doing well since her surgery. She has no pain. Her voice is not hoarse, but she feels like it is not her normal speaking voice. She has no dysphagia. No other complaints.    She presented to Dr. Barron in January for evaluation of a several week history of a central neck mass/goiter. She has not noticed a central mass in the neck. Her saga started in the fall when she had routine blood work that revealed elevated TPO antibodies.  She remained euthyroid, however.  She had an ultrasound and then an FNA at that time. She has left-sided nodules evident on ultrasound, with a dominant nodule evident on the left.     The cytology from this FNA revealed atypia of uncertain significance, so the Affirma test was performed, which in turn place the risk of malignancy around 50%.  She had been working with the general surgeon on the [place] and then decided to seek some other opinions.  She has family members that work at [facility] as well as here, so she presents for an opinion.  Other than subjectively being aware of the mass, she is predominantly asymptomatic.    Recently, She has had some compressive symptoms such as a globus sensation and mild dysphagia. She thinks that her voice is occasionally hoarse, but mostly normal. She denies odynophagia, throat pain, and otalgia. She is a non-smoker. She denies cough and throat clearing. There is not hemoptysis or hematemesis. She does not  experience shortness of breath occasionally. There is no family history of thyroid disease or cancer. There is no personal history of radiation exposure or treatment to the head and neck region.    Past Medical History:   Diagnosis Date    Breast cyst     Diverticulitis     GERD (gastroesophageal  reflux disease)     Nontoxic thyroid nodule 1/27/2019       Past Surgical History:   Procedure Laterality Date    BREAST BIOPSY      pt does not remember    COLECTOMY      LOBECTOMY, THYROID Left 2/27/2019    Performed by Luis A Barron MD at [facility] OR [address] [place] [address]    SHOULDER SURGERY Right          Current Outpatient Medications:     acetaminophen (TYLENOL) 500 MG tablet, Take 500 mg by mouth every 6 (six) hours as needed for Pain., Disp: , Rfl:     aspirin (ECOTRIN) 81 MG EC tablet, Take 81 mg by mouth 4 (four) times a week., Disp: , Rfl:     candesartan (ATACAND) 16 MG tablet, Take 16 mg by mouth once daily., Disp: , Rfl:     doxycycline (VIBRAMYCIN) 50 MG capsule, , Disp: , Rfl:     estradiol 0.05 mg/24 hr td ptsw (VIVELLE-DOT) 0.05 mg/24 hr, , Disp: , Rfl:     HYDROcodone-acetaminophen (NORCO) 7.5-325 mg per tablet, Take 1 tablet by mouth every 6 (six) hours as needed for Pain., Disp: 30 tablet, Rfl: 0    loratadine (CLARITIN) 10 mg tablet, Take 10 mg by mouth once daily., Disp: , Rfl:     naproxen sodium (ALEVE) 220 mg Cap, Take by mouth., Disp: , Rfl:     ondansetron (ZOFRAN-ODT) 8 MG TbDL, Take 1 tablet (8 mg total) by mouth every 8 (eight) hours as needed (nausea)., Disp: 15 tablet, Rfl: 0    pantoprazole (PROTONIX) 40 MG tablet, , Disp: , Rfl:     progesterone (PROMETRIUM) 100 MG capsule, , Disp: , Rfl:     SOOLANTRA 1 % Crea, , Disp: , Rfl:     spironolactone-hydrochlorothiazide (ALDACTAZIDE) 50-50 mg per tablet, Take 1 tablet by mouth every Mon, Wed, Fri. , Disp: , Rfl:     Review of patient's allergies indicates:   Allergen Reactions    Ciprofloxacin        Social History     Socioeconomic History    Marital status:      Spouse name: Not on file    Number of children: Not on file    Years of education: Not on file    Highest education level: Not on file   Social Needs    Financial resource strain: Not on file    Food insecurity - worry: Not on file    Food insecurity -  inability: Not on file    Transportation needs - medical: Not on file    Transportation needs - non-medical: Not on file   Occupational History    Not on file   Tobacco Use    Smoking status: Never Smoker    Smokeless tobacco: Never Used   Substance and Sexual Activity    Alcohol use: No    Drug use: Not on file    Sexual activity: Not on file   Other Topics Concern    Not on file   Social History Narrative    Not on file       Family History   Problem Relation Age of Onset    Breast cancer Maternal Aunt         60's    Hypertension Mother     Hypertension Father     Stroke Father        Review of Systems   Constitutional: Negative for activity change, appetite change, chills, fatigue, fever and unexpected weight change.   HENT: Positive for voice change. Negative for congestion, dental problem, drooling, ear discharge, ear pain, facial swelling, hearing loss, mouth sores, nosebleeds, postnasal drip, rhinorrhea, sinus pressure, sneezing, sore throat, tinnitus and trouble swallowing.    Eyes: Negative for pain and visual disturbance.   Respiratory: Negative for cough, choking and shortness of breath.    Cardiovascular: Negative for chest pain, palpitations and leg swelling.   Gastrointestinal: Negative for abdominal pain, constipation, diarrhea, nausea and vomiting.   Endocrine: Negative for cold intolerance and heat intolerance.   Genitourinary: Negative for difficulty urinating, dysuria and hematuria.   Musculoskeletal: Negative for arthralgias, neck pain and neck stiffness.   Skin: Negative for rash and wound.   Allergic/Immunologic: Negative for environmental allergies and immunocompromised state.   Neurological: Negative for dizziness, facial asymmetry, speech difficulty, weakness, light-headedness, numbness and headaches.   Hematological: Negative for adenopathy. Does not bruise/bleed easily.   Psychiatric/Behavioral: Negative for dysphoric mood. The patient is not nervous/anxious.         Objective:     Physical Exam   Constitutional: She is oriented to person, place, and time. She appears well-developed and well-nourished. No distress.   HENT:   Head: Normocephalic and atraumatic.   Right Ear: External ear normal.   Left Ear: External ear normal.   Neck:   Incision CDI.  No redness, drainage, or fluid collection noted.  Edges well approximated.     Cardiovascular: Normal rate.   Pulmonary/Chest: Effort normal. No respiratory distress.   Neurological: She is alert and oriented to person, place, and time.   Skin: She is not diaphoretic.   Vitals reviewed.       Thyroid US 10/30/2018:  FINDINGS:  The right lobe of the thyroid measures 4.5 x 1.2 x 1.2 cm with a heterogeneous echotexture.  No significant increased vascularity to the right lobe of the thyroid.    Isthmus measures approximately 3 mm with heterogeneous echotexture.  No significant soft tissue nodules noted.    The left lobe of the thyroid measures 4.6 x 1.6 x 1.5 cm with heterogeneous echogenicity.  In the lower pole of the left lobe there is a solid mostly hypoechoic nodule with slight increased peripheral vascularity measuring approximately 2.3 x 1.4 x 1.4 cm.  In the upper pole no significant soft tissue masses are visualized.        Thyroid carcinoma synoptic report, 2/27/2019  Procedure: Left lobectomy  Tumor focality: Unifocal  Tumor site: Left  Tumor size: 1.8 x 1.0 x 0.8 cm  Histologic type: Follicular carcinoma, minimally invasive  Margins:  Uninvolved by carcinoma  Distance of invasive carcinoma from closest margin: 1 mm  Angio invasion: Not identified  Lymphatic invasion: Not identified  Extrathyroidal extension: Not identified  Regional lymph nodes  Number of lymph nodes involved: 0  Number of lymph nodes examined: 3  Pathologic staging: pT1b N0 MX      Assessment & Plan:       Problem List Items Addressed This Visit        Oncology    Follicular carcinoma of thyroid     Rena Del Valle is healing well from her thyroid lobectomy.  Path report revealed follicular carcinoma. Dr. Barron had a long discussion with Mrs. Del Valle and her family regarding treatment options. Dr. Barron has recommended a completion thyroidectomy, but noted that close observation with Synthroid suppression, thyroglobulin trending and periodic ultrasound is another option. We will discuss her case at the upcoming endocrine conference. Questions answered.

## 2019-03-14 NOTE — PROGRESS NOTES
HEAD AND NECK SURGICAL ONCOLOGY CLINIC    Subjective:       Patient ID: Rena Del Valle is a 63 y.o. female.    Chief Complaint: Post-op Evaluation/ can't talk loud    HPI  Rena Del Valle is a 63 y.o. female who returns for a post op visit. She has been doing well since her surgery. She has no pain. Her voice is not hoarse, but she feels like it is not her normal speaking voice. She has no dysphagia. No other complaints.    She presented to Dr. Barron in January for evaluation of a several week history of a central neck mass/goiter. She has not noticed a central mass in the neck. Her saga started in the fall when she had routine blood work that revealed elevated TPO antibodies.  She remained euthyroid, however.  She had an ultrasound and then an FNA at that time. She has left-sided nodules evident on ultrasound, with a dominant nodule evident on the left.     The cytology from this FNA revealed atypia of uncertain significance, so the Affirma test was performed, which in turn place the risk of malignancy around 50%.  She had been working with the general surgeon on the [place] and then decided to seek some other opinions.  She has family members that work at [facility] as well as here, so she presents for an opinion.  Other than subjectively being aware of the mass, she is predominantly asymptomatic.    Recently, She has had some compressive symptoms such as a globus sensation and mild dysphagia. She thinks that her voice is occasionally hoarse, but mostly normal. She denies odynophagia, throat pain, and otalgia. She is a non-smoker. She denies cough and throat clearing. There is not hemoptysis or hematemesis. She does not  experience shortness of breath occasionally. There is no family history of thyroid disease or cancer. There is no personal history of radiation exposure or treatment to the head and neck region.    Past Medical History:   Diagnosis Date    Breast cyst     Diverticulitis     GERD (gastroesophageal  reflux disease)     Nontoxic thyroid nodule 1/27/2019       Past Surgical History:   Procedure Laterality Date    BREAST BIOPSY      pt does not remember    COLECTOMY      LOBECTOMY, THYROID Left 2/27/2019    Performed by [facility] A [facility] MD at [facility] OR [address] [place] [address]    SHOULDER SURGERY Right          Current Outpatient Medications:     acetaminophen (TYLENOL) 500 MG tablet, Take 500 mg by mouth every 6 (six) hours as needed for Pain., Disp: , Rfl:     aspirin (ECOTRIN) 81 MG EC tablet, Take 81 mg by mouth 4 (four) times a week., Disp: , Rfl:     candesartan (ATACAND) 16 MG tablet, Take 16 mg by mouth once daily., Disp: , Rfl:     doxycycline (VIBRAMYCIN) 50 MG capsule, , Disp: , Rfl:     estradiol 0.05 mg/24 hr td ptsw (VIVELLE-DOT) 0.05 mg/24 hr, , Disp: , Rfl:     HYDROcodone-acetaminophen (NORCO) 7.5-325 mg per tablet, Take 1 tablet by mouth every 6 (six) hours as needed for Pain., Disp: 30 tablet, Rfl: 0    loratadine (CLARITIN) 10 mg tablet, Take 10 mg by mouth once daily., Disp: , Rfl:     naproxen sodium (ALEVE) 220 mg Cap, Take by mouth., Disp: , Rfl:     ondansetron (ZOFRAN-ODT) 8 MG TbDL, Take 1 tablet (8 mg total) by mouth every 8 (eight) hours as needed (nausea)., Disp: 15 tablet, Rfl: 0    pantoprazole (PROTONIX) 40 MG tablet, , Disp: , Rfl:     progesterone (PROMETRIUM) 100 MG capsule, , Disp: , Rfl:     SOOLANTRA 1 % Crea, , Disp: , Rfl:     spironolactone-hydrochlorothiazide (ALDACTAZIDE) 50-50 mg per tablet, Take 1 tablet by mouth every Mon, Wed, Fri. , Disp: , Rfl:     Review of patient's allergies indicates:   Allergen Reactions    Ciprofloxacin        Social History     Socioeconomic History    Marital status:      Spouse name: Not on file    Number of children: Not on file    Years of education: Not on file    Highest education level: Not on file   Social Needs    Financial resource strain: Not on file    Food insecurity - worry: Not on file    Food insecurity -  inability: Not on file    Transportation needs - medical: Not on file    Transportation needs - non-medical: Not on file   Occupational History    Not on file   Tobacco Use    Smoking status: Never Smoker    Smokeless tobacco: Never Used   Substance and Sexual Activity    Alcohol use: No    Drug use: Not on file    Sexual activity: Not on file   Other Topics Concern    Not on file   Social History Narrative    Not on file       Family History   Problem Relation Age of Onset    Breast cancer Maternal Aunt         60's    Hypertension Mother     Hypertension Father     Stroke Father        Review of Systems   Constitutional: Negative for activity change, appetite change, chills, fatigue, fever and unexpected weight change.   HENT: Positive for voice change. Negative for congestion, dental problem, drooling, ear discharge, ear pain, facial swelling, hearing loss, mouth sores, nosebleeds, postnasal drip, rhinorrhea, sinus pressure, sneezing, sore throat, tinnitus and trouble swallowing.    Eyes: Negative for pain and visual disturbance.   Respiratory: Negative for cough, choking and shortness of breath.    Cardiovascular: Negative for chest pain, palpitations and leg swelling.   Gastrointestinal: Negative for abdominal pain, constipation, diarrhea, nausea and vomiting.   Endocrine: Negative for cold intolerance and heat intolerance.   Genitourinary: Negative for difficulty urinating, dysuria and hematuria.   Musculoskeletal: Negative for arthralgias, neck pain and neck stiffness.   Skin: Negative for rash and wound.   Allergic/Immunologic: Negative for environmental allergies and immunocompromised state.   Neurological: Negative for dizziness, facial asymmetry, speech difficulty, weakness, light-headedness, numbness and headaches.   Hematological: Negative for adenopathy. Does not bruise/bleed easily.   Psychiatric/Behavioral: Negative for dysphoric mood. The patient is not nervous/anxious.         Objective:     Physical Exam   Constitutional: She is oriented to person, place, and time. She appears well-developed and well-nourished. No distress.   HENT:   Head: Normocephalic and atraumatic.   Right Ear: External ear normal.   Left Ear: External ear normal.   Neck:   Incision CDI.  No redness, drainage, or fluid collection noted.  Edges well approximated.     Cardiovascular: Normal rate.   Pulmonary/Chest: Effort normal. No respiratory distress.   Neurological: She is alert and oriented to person, place, and time.   Skin: She is not diaphoretic.   Vitals reviewed.       Thyroid US 10/30/2018:  FINDINGS:  The right lobe of the thyroid measures 4.5 x 1.2 x 1.2 cm with a heterogeneous echotexture.  No significant increased vascularity to the right lobe of the thyroid.    Isthmus measures approximately 3 mm with heterogeneous echotexture.  No significant soft tissue nodules noted.    The left lobe of the thyroid measures 4.6 x 1.6 x 1.5 cm with heterogeneous echogenicity.  In the lower pole of the left lobe there is a solid mostly hypoechoic nodule with slight increased peripheral vascularity measuring approximately 2.3 x 1.4 x 1.4 cm.  In the upper pole no significant soft tissue masses are visualized.        Thyroid carcinoma synoptic report, 2/27/2019  Procedure: Left lobectomy  Tumor focality: Unifocal  Tumor site: Left  Tumor size: 1.8 x 1.0 x 0.8 cm  Histologic type: Follicular carcinoma, minimally invasive  Margins:  Uninvolved by carcinoma  Distance of invasive carcinoma from closest margin: 1 mm  Angio invasion: Not identified  Lymphatic invasion: Not identified  Extrathyroidal extension: Not identified  Regional lymph nodes  Number of lymph nodes involved: 0  Number of lymph nodes examined: 3  Pathologic staging: pT1b N0 MX      Assessment & Plan:       Problem List Items Addressed This Visit        Oncology    Follicular carcinoma of thyroid     Rena Del Valle is healing well from her thyroid lobectomy.  Path report revealed follicular carcinoma. Dr. Barron had a long discussion with Mrs. Del Valle and her family regarding treatment options. Dr. Barrno has recommended a completion thyroidectomy, but noted that close observation with Synthroid suppression, thyroglobulin trending and periodic ultrasound is another option. We will discuss her case at the upcoming endocrine conference. Questions answered.

## 2019-03-15 ENCOUNTER — PATIENT MESSAGE (OUTPATIENT)
Dept: OTOLARYNGOLOGY | Facility: CLINIC | Age: 64
End: 2019-03-15

## 2019-03-15 PROBLEM — E04.1 NONTOXIC THYROID NODULE: Status: RESOLVED | Noted: 2019-01-27 | Resolved: 2019-03-15

## 2019-03-15 PROBLEM — E04.1 LEFT THYROID NODULE: Status: RESOLVED | Noted: 2019-02-27 | Resolved: 2019-03-15

## 2019-03-15 PROBLEM — C73 FOLLICULAR CARCINOMA OF THYROID: Status: ACTIVE | Noted: 2019-03-15

## 2019-03-15 NOTE — ASSESSMENT & PLAN NOTE
Rena Del Valle is healing well from her thyroid lobectomy. Path report revealed follicular carcinoma. Dr. Barron had a long discussion with Mrs. Del Valle and her family regarding treatment options. Dr. Barron has recommended a completion thyroidectomy, but noted that close observation with Synthroid suppression, thyroglobulin trending and periodic ultrasound is another option. We will discuss her case at the upcoming endocrine conference. Questions answered.

## 2019-03-21 ENCOUNTER — PATIENT MESSAGE (OUTPATIENT)
Dept: OTOLARYNGOLOGY | Facility: CLINIC | Age: 64
End: 2019-03-21

## 2019-03-25 DIAGNOSIS — C73 FOLLICULAR CARCINOMA OF THYROID: Primary | ICD-10-CM

## 2019-03-27 ENCOUNTER — ANESTHESIA EVENT (OUTPATIENT)
Dept: SURGERY | Facility: HOSPITAL | Age: 64
End: 2019-03-27
Payer: COMMERCIAL

## 2019-03-27 NOTE — PRE ADMISSION SCREENING
Anesthesia Assessment: Preoperative EQUATION    Planned Procedure: Procedure(s) (LRB):  THYROIDECTOMY (Right)  Requested Anesthesia Type:General  Surgeon: Luis A Barron MD  Service: ENT  Known or anticipated Date of Surgery:4/10/2019    Surgeon notes: reviewed    Electronic QUestionnaire Assessment completed via nurse interview with patient.    NO AQ    Triage considerations:     The patient has no apparent active cardiac condition (No unstable coronary Syndrome such as severe unstable angina or recent [<1 month] myocardial infarction, decompensated CHF, severe valvular   disease or significant arrhythmia)    Previous anesthesia records:GETA and No problems   2/27/2019 LOBECTOMY, THYROID (Left Neck)   Airway/Jaw/Neck:  Airway Findings: Mouth Opening: Normal Tongue: Normal  General Airway Assessment: Adult  Mallampati: III  TM Distance: Normal, at least 6 cm      Airway Present Prior to Hospital Arrival?: No Placement Date: 02/27/19 Placement Time: 1122 Method of Intubation: Direct laryngoscopy Inserted by: CRNA Airway Device: Endotracheal Tube Mask Ventilation: Easy Intubated: Postinduction Blade: Dillon #3 Style: Cuffed Cuff Inflation: Minimal occlusive pressure Placement Verified By: Auscultation;Capnometry;ETT Condensation Grade: Grade I Complicating Factors: None Findings Post-Intubation: Positive EtCO2;Bilateral breath sounds;Atraumatic/Condition of teeth unchanged Complications: None Breath Sounds: Equal Bilateral Insertion attempts (enter comment if more than 2 attempts): 1 Removal Date: 02/27/19 Removal Time: 1741       Did have problem with anesthesia: hard to wake up with shoulder surgery at [facility].       Last PCP note: outside [facility]   Subspecialty notes: ENT, Neurosurgery    Other important co-morbidities:obese,& follicular carcinoma of thyroid   Tests already available:  Available tests,  within 1 month , within [facility] .3/11/2019 MRI THORACIC SPINE W W/O CONTRAST, MRI LUMBAR SPINE W W/O CONTRAST,  MRI CERVICAL SPINE W W/O CONTRAST, MRI BRAIN W W/O CONTRAST, 2/21/2019 EKG, CA, PTH, 2/13/2019 CR            Instructions given. (See in Nurse's note)    Optimization:      Plan:      Patient  has previously scheduled Medical Appointment:4/8 DR. LIZET JORDAN    Navigation:                          Straight Line to surgery.               No tests, anesthesia preop clinic visit, or consult required.

## 2019-03-27 NOTE — ANESTHESIA PREPROCEDURE EVALUATION
Anesthesia Assessment: Preoperative EQUATION     Planned Procedure: Procedure(s) (LRB):  THYROIDECTOMY (Right)  Requested Anesthesia Type:General  Surgeon: Luis A Barron MD  Service: ENT  Known or anticipated Date of Surgery:4/10/2019     Surgeon notes: reviewed     Electronic QUestionnaire Assessment completed via nurse interview with patient.    NO AQ     Triage considerations:      The patient has no apparent active cardiac condition (No unstable coronary Syndrome such as severe unstable angina or recent [<1 month] myocardial infarction, decompensated CHF, severe valvular   disease or significant arrhythmia)     Previous anesthesia records:GETA and No problems   2/27/2019 LOBECTOMY, THYROID (Left Neck)   Airway/Jaw/Neck:  Airway Findings: Mouth Opening: Normal Tongue: Normal  General Airway Assessment: Adult  Mallampati: III  TM Distance: Normal, at least 6 cm      Airway Present Prior to Hospital Arrival?: No Placement Date: 02/27/19 Placement Time: 1122 Method of Intubation: Direct laryngoscopy Inserted by: CRNA Airway Device: Endotracheal Tube Mask Ventilation: Easy Intubated: Postinduction Blade: Dillon #3 Style: Cuffed Cuff Inflation: Minimal occlusive pressure Placement Verified By: Auscultation;Capnometry;ETT Condensation Grade: Grade I Complicating Factors: None Findings Post-Intubation: Positive EtCO2;Bilateral breath sounds;Atraumatic/Condition of teeth unchanged Complications: None Breath Sounds: Equal Bilateral Insertion attempts (enter comment if more than 2 attempts): 1 Removal Date: 02/27/19 Removal Time: 1741         Did have problem with anesthesia: hard to wake up with shoulder surgery at [facility].         Last PCP note: outside [facility]   Subspecialty notes: ENT, Neurosurgery     Other important co-morbidities:obese,& follicular carcinoma of thyroid   Tests already available:  Available tests,  within 1 month , within [facility] .3/11/2019 MRI THORACIC SPINE W W/O CONTRAST, MRI LUMBAR SPINE W  W/O CONTRAST, MRI CERVICAL SPINE W W/O CONTRAST, MRI BRAIN W W/O CONTRAST, 2/21/2019 EKG, CA, PTH, 2/13/2019 CR                       Instructions given. (See in Nurse's note)     Optimization:         Plan:                                   Patient  has previously scheduled Medical Appointment:4/8 DR. LIZET JORDAN     Navigation:                                        Straight Line to surgery.                     No tests, anesthesia preop clinic visit, or consult required.                                                  Electronically signed by Vinita Piña RN at 3/27/2019  9:05 AM       Pre-admit on 4/10/2019            Detailed Report                                                                                                                         03/27/2019  Rena Del Valle is a 63 y.o., female.    Anesthesia Evaluation         Review of Systems  Anesthesia Hx:  No problem with last surgery.  Hard to wake up with shoulder surgery at [facility]. Denies Family Hx of Anesthesia complications. Personal Hx of Anesthesia complications (hard to wake up with shoulder surgery only)   Social:  Non-Smoker, Social Alcohol Use    Hematology/Oncology:  Hematology Normal       Thyroid Current/Recent Cancer.   EENT/Dental:   Throat Symptoms (occasional hoarseness and mild dysphasia) include Hoarseness    Cardiovascular:    Denies Angina.  Functional Capacity 4 METS, able to climb 2 flights of stairs    Pulmonary:   Denies Shortness of breath.  Denies Recent URI.    Renal/:  Renal/ Normal     Hepatic/GI:  Esophageal / Stomach Disorders Gerd Controlled by chronic antireflux medication.    Musculoskeletal:  Musculoskeletal General/Symptoms: Functional capacity is ambulatory without assistance.    Neurological:  Dx of Headaches (daily persistent headaches)   Endocrine:  Endocrine Normal    Psych:  Psychiatric Normal           Physical Exam  General:  Well nourished    Airway/Jaw/Neck:  Airway Findings: Mouth  Opening: Normal Tongue: Normal  General Airway Assessment: Adult  Mallampati: III  Improves to II with phonation.  TM Distance: Normal, at least 6 cm  Jaw/Neck Findings:  Neck ROM: Normal ROM       Chest/Lungs:  Chest/Lungs Findings: Normal Respiratory Rate     Heart/Vascular:  Heart Findings: Rate: Normal        Mental Status:  Mental Status Findings:  Alert and Oriented         Anesthesia Plan  Type of Anesthesia, risks & benefits discussed:  Anesthesia Type:  general  Patient's Preference: General   Intra-op Monitoring Plan: standard ASA monitors  Intra-op Monitoring Plan Comments:   Post Op Pain Control Plan: IV/PO Opioids PRN  Post Op Pain Control Plan Comments:   Induction:   IV  Beta Blocker:  Patient is not currently on a Beta-Blocker (No further documentation required).       Informed Consent: Patient understands risks and agrees with Anesthesia plan.  Questions answered. Anesthesia consent signed with patient.  ASA Score: 3     Day of Surgery Review of History & Physical:    H&P update referred to the surgeon.     Anesthesia Plan Notes: NPO confirmed.   Hx of PONV but not with decadron / ondansetron last case.  Some sore throat previously, with 7.0 ETT  Needs glidescope / NIMS tube 7.0        Ready For Surgery From Anesthesia Perspective.

## 2019-03-27 NOTE — PRE-PROCEDURE INSTRUCTIONS
Patient stated did not have any problems with anesthesia with last surgery. However, with her shoulder surgery at [facility], was hard to wake up. Preop instructions given. Hold asa, asa containing products, nsaids, vitamins and supplements one week prior to surgery.Medication instructions given.    Shower the night before surgery and the morning of surgery with an antibacterial soap( hibiclens or dial antibacterial soap).  Nothing on the skin once shower. Do not apply any deodorant, lotion, powder, perfume,or aftershave. No makeup or moisturizer. No fingernail polish or jewelry going to surgery. May have solid foods, gum, and hard candy until 8 hours before surgery/procedure time.  May have clear liquids( water, gatorade, powerade or apple juice) until 2 hours prior to surgery/procedure time.  No red or orange drinks. If in doubt , drink water. Nothing to drink 2 hours before arrival time for surgery/procedure. If you are told to take medication in the morning of surgery, it may be taken with a sip of water. If your doctor tells you something different pertaining to when to stop eating or drinking, follow your doctor's instructions. Call for changes in status or questions. Verbalizes understanding.

## 2019-04-02 ENCOUNTER — PATIENT MESSAGE (OUTPATIENT)
Dept: OTOLARYNGOLOGY | Facility: CLINIC | Age: 64
End: 2019-04-02

## 2019-04-05 ENCOUNTER — PATIENT MESSAGE (OUTPATIENT)
Dept: SURGERY | Facility: HOSPITAL | Age: 64
End: 2019-04-05

## 2019-04-08 ENCOUNTER — OFFICE VISIT (OUTPATIENT)
Dept: NEUROSURGERY | Facility: CLINIC | Age: 64
End: 2019-04-08
Payer: COMMERCIAL

## 2019-04-08 VITALS
DIASTOLIC BLOOD PRESSURE: 79 MMHG | BODY MASS INDEX: 32.36 KG/M2 | WEIGHT: 238.63 LBS | HEART RATE: 92 BPM | TEMPERATURE: 98 F | SYSTOLIC BLOOD PRESSURE: 142 MMHG

## 2019-04-08 DIAGNOSIS — G96.01 CSF RHINORRHEA: Primary | ICD-10-CM

## 2019-04-08 PROCEDURE — 99999 PR PBB SHADOW E&M-EST. PATIENT-LVL III: CPT | Mod: PBBFAC,,, | Performed by: NEUROLOGICAL SURGERY

## 2019-04-08 PROCEDURE — 3008F PR BODY MASS INDEX (BMI) DOCUMENTED: ICD-10-PCS | Mod: CPTII,S$GLB,, | Performed by: NEUROLOGICAL SURGERY

## 2019-04-08 PROCEDURE — 99213 PR OFFICE/OUTPT VISIT, EST, LEVL III, 20-29 MIN: ICD-10-PCS | Mod: S$GLB,,, | Performed by: NEUROLOGICAL SURGERY

## 2019-04-08 PROCEDURE — 99999 PR PBB SHADOW E&M-EST. PATIENT-LVL III: ICD-10-PCS | Mod: PBBFAC,,, | Performed by: NEUROLOGICAL SURGERY

## 2019-04-08 PROCEDURE — 99213 OFFICE O/P EST LOW 20 MIN: CPT | Mod: S$GLB,,, | Performed by: NEUROLOGICAL SURGERY

## 2019-04-08 PROCEDURE — 3008F BODY MASS INDEX DOCD: CPT | Mod: CPTII,S$GLB,, | Performed by: NEUROLOGICAL SURGERY

## 2019-04-08 NOTE — PROGRESS NOTES
This office note has been dictated.  Rena Del Valle returned in neurosurgical followup to the office this afternoon.  She   underwent partial thyroidectomy with Dr. Barron on 02/27/19 and was found to have   follicular cancer.  Total thyroidectomy is now planned for this week.  In the   meantime, she has actually shown improvement in her headaches.  She continues to   have discomfort if she coughs or strains or with the change in position, but   this is both less frequent and less intense and seems to be gradually improving.    She has had no new focal neurological symptoms, visual loss, hearing loss,   gait difficulty or focal motor weakness.  She has not had enough nasal drainage   to collect fluid for possible beta transferrin analysis.    I did not reexamine her in detail today.  She is bright and alert and in good   spirits.  There is no active nasal discharge.  Extraocular movements are good.    She stood and walked without difficulty and seems generally neurologically   intact.    MRI of the brain including a T2 space study and CSF oriented MRIs of the   cervical, thoracic and lumbar spine were done on 03/11/19.  There was no   evidence for cerebral spinal fluid leakage either intracranial or spinal.    Her headaches seem to be improving.  I believe we can follow this and see how   she does.  If she develops more obvious CSF leakage, I will be happy to see her   back and reevaluate.            RDS/IN  dd: 04/08/2019 17:08:41 (CDT)  td: 04/09/2019 10:46:08 (CDT)  Doc ID   #[id number]  Job ID #[id number]    CC: Rena Del Valle

## 2019-04-09 ENCOUNTER — TELEPHONE (OUTPATIENT)
Dept: OTOLARYNGOLOGY | Facility: CLINIC | Age: 64
End: 2019-04-09

## 2019-04-10 ENCOUNTER — HOSPITAL ENCOUNTER (OUTPATIENT)
Facility: HOSPITAL | Age: 64
Discharge: HOME | End: 2019-04-10
Attending: OTOLARYNGOLOGY | Admitting: OTOLARYNGOLOGY
Payer: COMMERCIAL

## 2019-04-10 ENCOUNTER — ANESTHESIA (OUTPATIENT)
Dept: SURGERY | Facility: HOSPITAL | Age: 64
End: 2019-04-10
Payer: COMMERCIAL

## 2019-04-10 VITALS
TEMPERATURE: 98 F | DIASTOLIC BLOOD PRESSURE: 80 MMHG | HEART RATE: 77 BPM | HEIGHT: 72 IN | WEIGHT: 230 LBS | RESPIRATION RATE: 18 BRPM | BODY MASS INDEX: 31.15 KG/M2 | OXYGEN SATURATION: 98 % | SYSTOLIC BLOOD PRESSURE: 158 MMHG

## 2019-04-10 DIAGNOSIS — C73 FOLLICULAR CARCINOMA OF THYROID: ICD-10-CM

## 2019-04-10 LAB — PTH-INTACT SERPL-MCNC: 33 PG/ML (ref 9–77)

## 2019-04-10 PROCEDURE — 37000008 HC ANESTHESIA 1ST 15 MINUTES: Performed by: OTOLARYNGOLOGY

## 2019-04-10 PROCEDURE — 60260 PR THYROIDECTOMY POST PREV THYR SURG: ICD-10-PCS | Mod: 58,RT,, | Performed by: OTOLARYNGOLOGY

## 2019-04-10 PROCEDURE — 63600175 PHARM REV CODE 636 W HCPCS: Performed by: NURSE ANESTHETIST, CERTIFIED REGISTERED

## 2019-04-10 PROCEDURE — 88331 PATH CONSLTJ SURG 1 BLK 1SPC: CPT | Mod: 26,,, | Performed by: PATHOLOGY

## 2019-04-10 PROCEDURE — 36000706: Performed by: OTOLARYNGOLOGY

## 2019-04-10 PROCEDURE — 25000003 PHARM REV CODE 250: Performed by: NURSE ANESTHETIST, CERTIFIED REGISTERED

## 2019-04-10 PROCEDURE — 71000015 HC POSTOP RECOV 1ST HR: Performed by: OTOLARYNGOLOGY

## 2019-04-10 PROCEDURE — 25000003 PHARM REV CODE 250: Performed by: ANESTHESIOLOGY

## 2019-04-10 PROCEDURE — 88302 TISSUE EXAM BY PATHOLOGIST: CPT | Mod: 26,,, | Performed by: PATHOLOGY

## 2019-04-10 PROCEDURE — 37000009 HC ANESTHESIA EA ADD 15 MINS: Performed by: OTOLARYNGOLOGY

## 2019-04-10 PROCEDURE — 25000003 PHARM REV CODE 250: Performed by: STUDENT IN AN ORGANIZED HEALTH CARE EDUCATION/TRAINING PROGRAM

## 2019-04-10 PROCEDURE — 88307 TISSUE SPECIMEN TO PATHOLOGY - SURGERY: ICD-10-PCS | Mod: 26,,, | Performed by: PATHOLOGY

## 2019-04-10 PROCEDURE — 25000003 PHARM REV CODE 250: Performed by: OTOLARYNGOLOGY

## 2019-04-10 PROCEDURE — 88302 TISSUE SPECIMEN TO PATHOLOGY - SURGERY: ICD-10-PCS | Mod: 26,,, | Performed by: PATHOLOGY

## 2019-04-10 PROCEDURE — D9220A PRA ANESTHESIA: Mod: ANES,,, | Performed by: ANESTHESIOLOGY

## 2019-04-10 PROCEDURE — 71000033 HC RECOVERY, INTIAL HOUR: Performed by: OTOLARYNGOLOGY

## 2019-04-10 PROCEDURE — D9220A PRA ANESTHESIA: ICD-10-PCS | Mod: ANES,,, | Performed by: ANESTHESIOLOGY

## 2019-04-10 PROCEDURE — 88331 TISSUE SPECIMEN TO PATHOLOGY - SURGERY: ICD-10-PCS | Mod: 26,,, | Performed by: PATHOLOGY

## 2019-04-10 PROCEDURE — 88305 TISSUE EXAM BY PATHOLOGIST: CPT | Performed by: PATHOLOGY

## 2019-04-10 PROCEDURE — 63600175 PHARM REV CODE 636 W HCPCS: Performed by: OTOLARYNGOLOGY

## 2019-04-10 PROCEDURE — 27000221 HC OXYGEN, UP TO 24 HOURS

## 2019-04-10 PROCEDURE — D9220A PRA ANESTHESIA: Mod: CRNA,,, | Performed by: NURSE ANESTHETIST, CERTIFIED REGISTERED

## 2019-04-10 PROCEDURE — 60260 REPEAT THYROID SURGERY: CPT | Mod: 58,RT,, | Performed by: OTOLARYNGOLOGY

## 2019-04-10 PROCEDURE — 71000039 HC RECOVERY, EACH ADD'L HOUR: Performed by: OTOLARYNGOLOGY

## 2019-04-10 PROCEDURE — 88307 TISSUE EXAM BY PATHOLOGIST: CPT | Mod: 26,,, | Performed by: PATHOLOGY

## 2019-04-10 PROCEDURE — 88305 TISSUE EXAM BY PATHOLOGIST: CPT | Mod: 26,,, | Performed by: PATHOLOGY

## 2019-04-10 PROCEDURE — 63600175 PHARM REV CODE 636 W HCPCS: Performed by: ANESTHESIOLOGY

## 2019-04-10 PROCEDURE — 63600175 PHARM REV CODE 636 W HCPCS

## 2019-04-10 PROCEDURE — 36000707: Performed by: OTOLARYNGOLOGY

## 2019-04-10 PROCEDURE — 88305 TISSUE SPECIMEN TO PATHOLOGY - SURGERY: ICD-10-PCS | Mod: 26,,, | Performed by: PATHOLOGY

## 2019-04-10 PROCEDURE — 83970 ASSAY OF PARATHORMONE: CPT

## 2019-04-10 PROCEDURE — D9220A PRA ANESTHESIA: ICD-10-PCS | Mod: CRNA,,, | Performed by: NURSE ANESTHETIST, CERTIFIED REGISTERED

## 2019-04-10 PROCEDURE — 94761 N-INVAS EAR/PLS OXIMETRY MLT: CPT

## 2019-04-10 RX ORDER — FENTANYL CITRATE 50 UG/ML
INJECTION, SOLUTION INTRAMUSCULAR; INTRAVENOUS
Status: COMPLETED
Start: 2019-04-10 | End: 2019-04-10

## 2019-04-10 RX ORDER — FENTANYL CITRATE 50 UG/ML
25 INJECTION, SOLUTION INTRAMUSCULAR; INTRAVENOUS EVERY 5 MIN PRN
Status: COMPLETED | OUTPATIENT
Start: 2019-04-10 | End: 2019-04-10

## 2019-04-10 RX ORDER — CEFAZOLIN SODIUM 1 G/3ML
2 INJECTION, POWDER, FOR SOLUTION INTRAMUSCULAR; INTRAVENOUS
Status: COMPLETED | OUTPATIENT
Start: 2019-04-10 | End: 2019-04-10

## 2019-04-10 RX ORDER — SODIUM CHLORIDE 0.9 % (FLUSH) 0.9 %
3 SYRINGE (ML) INJECTION
Status: DISCONTINUED | OUTPATIENT
Start: 2019-04-10 | End: 2019-04-10 | Stop reason: HOSPADM

## 2019-04-10 RX ORDER — ROCURONIUM BROMIDE 10 MG/ML
INJECTION, SOLUTION INTRAVENOUS
Status: DISCONTINUED | OUTPATIENT
Start: 2019-04-10 | End: 2019-04-10

## 2019-04-10 RX ORDER — OXYCODONE AND ACETAMINOPHEN 10; 325 MG/1; MG/1
1 TABLET ORAL ONCE
Status: COMPLETED | OUTPATIENT
Start: 2019-04-10 | End: 2019-04-10

## 2019-04-10 RX ORDER — ONDANSETRON 8 MG/1
8 TABLET, ORALLY DISINTEGRATING ORAL ONCE
Status: COMPLETED | OUTPATIENT
Start: 2019-04-10 | End: 2019-04-10

## 2019-04-10 RX ORDER — CALCIUM CARBONATE 400(1000)
TABLET,CHEWABLE ORAL
Qty: 46 TABLET | Refills: 0 | Status: SHIPPED | OUTPATIENT
Start: 2019-04-10 | End: 2020-10-06

## 2019-04-10 RX ORDER — SUCCINYLCHOLINE CHLORIDE 20 MG/ML
INJECTION INTRAMUSCULAR; INTRAVENOUS
Status: DISCONTINUED | OUTPATIENT
Start: 2019-04-10 | End: 2019-04-10

## 2019-04-10 RX ORDER — DEXAMETHASONE SODIUM PHOSPHATE 4 MG/ML
INJECTION, SOLUTION INTRA-ARTICULAR; INTRALESIONAL; INTRAMUSCULAR; INTRAVENOUS; SOFT TISSUE
Status: DISCONTINUED | OUTPATIENT
Start: 2019-04-10 | End: 2019-04-10

## 2019-04-10 RX ORDER — FENTANYL CITRATE 50 UG/ML
INJECTION, SOLUTION INTRAMUSCULAR; INTRAVENOUS
Status: DISCONTINUED | OUTPATIENT
Start: 2019-04-10 | End: 2019-04-10

## 2019-04-10 RX ORDER — KETAMINE HCL IN 0.9 % NACL 50 MG/5 ML
SYRINGE (ML) INTRAVENOUS
Status: DISCONTINUED | OUTPATIENT
Start: 2019-04-10 | End: 2019-04-10

## 2019-04-10 RX ORDER — MIDAZOLAM HYDROCHLORIDE 1 MG/ML
INJECTION, SOLUTION INTRAMUSCULAR; INTRAVENOUS
Status: DISCONTINUED | OUTPATIENT
Start: 2019-04-10 | End: 2019-04-10

## 2019-04-10 RX ORDER — LIDOCAINE HYDROCHLORIDE AND EPINEPHRINE 10; 10 MG/ML; UG/ML
INJECTION, SOLUTION INFILTRATION; PERINEURAL
Status: DISCONTINUED | OUTPATIENT
Start: 2019-04-10 | End: 2019-04-10 | Stop reason: HOSPADM

## 2019-04-10 RX ORDER — LEVOTHYROXINE SODIUM 112 UG/1
112 TABLET ORAL
Qty: 90 TABLET | Refills: 0 | Status: SHIPPED | OUTPATIENT
Start: 2019-04-10 | End: 2019-05-31

## 2019-04-10 RX ORDER — OXYCODONE AND ACETAMINOPHEN 10; 325 MG/1; MG/1
1 TABLET ORAL EVERY 4 HOURS PRN
Qty: 5 TABLET | Refills: 0 | Status: SHIPPED | OUTPATIENT
Start: 2019-04-10 | End: 2023-08-15

## 2019-04-10 RX ORDER — SODIUM CHLORIDE 9 MG/ML
INJECTION, SOLUTION INTRAVENOUS CONTINUOUS PRN
Status: DISCONTINUED | OUTPATIENT
Start: 2019-04-10 | End: 2019-04-10

## 2019-04-10 RX ORDER — ONDANSETRON 8 MG/1
8 TABLET, ORALLY DISINTEGRATING ORAL EVERY 6 HOURS PRN
Qty: 20 TABLET | Refills: 0 | Status: SHIPPED | OUTPATIENT
Start: 2019-04-10 | End: 2019-04-17

## 2019-04-10 RX ORDER — PROPOFOL 10 MG/ML
VIAL (ML) INTRAVENOUS
Status: DISCONTINUED | OUTPATIENT
Start: 2019-04-10 | End: 2019-04-10

## 2019-04-10 RX ORDER — LIDOCAINE HCL/PF 100 MG/5ML
SYRINGE (ML) INTRAVENOUS
Status: DISCONTINUED | OUTPATIENT
Start: 2019-04-10 | End: 2019-04-10

## 2019-04-10 RX ADMIN — CEFAZOLIN 2 G: 330 INJECTION, POWDER, FOR SOLUTION INTRAMUSCULAR; INTRAVENOUS at 10:04

## 2019-04-10 RX ADMIN — FENTANYL CITRATE 100 MCG: 50 INJECTION, SOLUTION INTRAMUSCULAR; INTRAVENOUS at 10:04

## 2019-04-10 RX ADMIN — LIDOCAINE HYDROCHLORIDE 60 MG: 20 INJECTION, SOLUTION INTRAVENOUS at 10:04

## 2019-04-10 RX ADMIN — FENTANYL CITRATE 50 MCG: 50 INJECTION, SOLUTION INTRAMUSCULAR; INTRAVENOUS at 11:04

## 2019-04-10 RX ADMIN — MIDAZOLAM HYDROCHLORIDE 2 MG: 1 INJECTION, SOLUTION INTRAMUSCULAR; INTRAVENOUS at 10:04

## 2019-04-10 RX ADMIN — FENTANYL CITRATE 25 MCG: 50 INJECTION INTRAMUSCULAR; INTRAVENOUS at 01:04

## 2019-04-10 RX ADMIN — OXYCODONE AND ACETAMINOPHEN 1 TABLET: 10; 325 TABLET ORAL at 02:04

## 2019-04-10 RX ADMIN — Medication 30 MG: at 10:04

## 2019-04-10 RX ADMIN — SODIUM CHLORIDE: 0.9 INJECTION, SOLUTION INTRAVENOUS at 09:04

## 2019-04-10 RX ADMIN — SUCCINYLCHOLINE CHLORIDE 120 MG: 20 INJECTION, SOLUTION INTRAMUSCULAR; INTRAVENOUS at 10:04

## 2019-04-10 RX ADMIN — DEXAMETHASONE SODIUM PHOSPHATE 8 MG: 4 INJECTION, SOLUTION INTRAMUSCULAR; INTRAVENOUS at 10:04

## 2019-04-10 RX ADMIN — ROCURONIUM BROMIDE 10 MG: 10 INJECTION, SOLUTION INTRAVENOUS at 10:04

## 2019-04-10 RX ADMIN — PROPOFOL 200 MG: 10 INJECTION, EMULSION INTRAVENOUS at 10:04

## 2019-04-10 RX ADMIN — SODIUM CHLORIDE, SODIUM GLUCONATE, SODIUM ACETATE, POTASSIUM CHLORIDE, MAGNESIUM CHLORIDE, SODIUM PHOSPHATE, DIBASIC, AND POTASSIUM PHOSPHATE: .53; .5; .37; .037; .03; .012; .00082 INJECTION, SOLUTION INTRAVENOUS at 11:04

## 2019-04-10 RX ADMIN — ONDANSETRON 8 MG: 8 TABLET, ORALLY DISINTEGRATING ORAL at 03:04

## 2019-04-10 NOTE — TRANSFER OF CARE
Anesthesia Transfer of Care Note    Patient: Rena Del Valle    Procedure(s) Performed: Procedure(s) (LRB):  THYROIDECTOMY (Right)    Patient location: PACU    Anesthesia Type: general    Transport from OR: Transported from OR on 6-10 L/min O2 by face mask with adequate spontaneous ventilation    Post pain: adequate analgesia    Post assessment: no apparent anesthetic complications    Post vital signs: stable    Level of consciousness: awake, alert and oriented    Nausea/Vomiting: no nausea/vomiting    Complications: none    Transfer of care protocol was followed      Last vitals:   Visit Vitals  /66 (BP Location: Right arm, Patient Position: Lying)   Pulse 88   Temp 36.4 °C (97.6 °F) (Oral)   Resp 20   Ht 6' (1.829 m)   Wt 104.3 kg (230 lb)   SpO2 97%   Breastfeeding? No   BMI 31.19 kg/m²

## 2019-04-10 NOTE — PLAN OF CARE
Problem: Adult Inpatient Plan of Care  Goal: Plan of Care Review  Outcome: Outcome(s) achieved Date Met: 04/10/19    Discharge instructions and prescription given to patient and spouse. Verbalized understanding. Patient stable, tolerating fluids. No complaints at this time. Patient adequate for discharge.

## 2019-04-10 NOTE — ANESTHESIA POSTPROCEDURE EVALUATION
Anesthesia Post Evaluation    Patient: Rena Del Valle    Procedure(s) Performed: Procedure(s) (LRB):  THYROIDECTOMY (Right)    Final Anesthesia Type: general  Patient location during evaluation: PACU  Patient participation: Yes- Able to Participate  Level of consciousness: awake and alert  Post-procedure vital signs: reviewed and stable  Pain management: adequate  Airway patency: patent  PONV status at discharge: No PONV  Anesthetic complications: no      Cardiovascular status: blood pressure returned to baseline  Respiratory status: unassisted  Hydration status: euvolemic  Follow-up not needed.          Vitals Value Taken Time   /80 4/10/2019  3:22 PM   Temp 36.4 °C (97.5 °F) 4/10/2019  3:22 PM   Pulse 79 4/10/2019  3:26 PM   Resp 18 4/10/2019  3:22 PM   SpO2 99 % 4/10/2019  3:26 PM   Vitals shown include unvalidated device data.      Event Time     Out of Recovery 14:37:31          Pain/Sania Score: Pain Rating Prior to Med Admin: 4 (4/10/2019  2:46 PM)  Pain Rating Post Med Admin: 5 (4/10/2019  2:00 PM)  Sania Score: 10 (4/10/2019  2:00 PM)

## 2019-04-10 NOTE — INTERVAL H&P NOTE
The patient has been examined and the H&P has been reviewed:    I concur with the findings and no changes have occurred since H&P was written.     Proceed to surgery for completion right thyroid lobectomy.    Anesthesia/Surgery risks, benefits and alternative options discussed and understood by patient/family.          Active Hospital Problems    Diagnosis  POA    Follicular carcinoma of thyroid [C73]  Yes      Resolved Hospital Problems   No resolved problems to display.

## 2019-04-10 NOTE — DISCHARGE INSTRUCTIONS
After Thyroid Surgery (Thyroidectomy)     Your doctor will monitor your recovery to be sure youre healing correctly and that your thyroid problem is under control.     After your surgery, follow your healthcare provider's instructions exactly. Take your medicines or hormone pills every day. And see your healthcare provider for regular checkups. Once your thyroid problems are under control, you can get back to doing the things you like to do.  While youre healing  · Dont let your incision area get wet for a few days after your surgery.  · Schedule a follow-up visit with the surgeon or your primary care provider to have your incision checked. If you still have staples or sutures, they may be removed. Your incision will be red and raised at first. It will probably flatten out and fade in about 6 months.  · You may need to take thyroid hormone pills. These pills replace the hormone that your thyroid used to make. Your healthcare provider will adjust the dosage of this hormone until its right for you. Make sure to take your thyroid pills on an empty stomach.   · Your parathyroid glands may not work normally. If so, you may need to take calcium and vitamin D supplements in the first weeks after surgery.  · Dont do strenuous physical activity for a few weeks.  · Dont go back to work until your healthcare provider says its OK.  Managing your health  · If youve been given thyroid hormone pills or other medicine, take these exactly as directed to help keep your hormones at the right levels. Always take thyroid hormone pills on an empty stomach.  · See your healthcare provider for regular blood tests. These tests are to check that your hormone medicine is at the right dose for you.  · If you have a nodule, you may need follow-up tests. These are to check for changes in its size or for the appearance of additional nodules.  · If youve had treatment for cancer, you will need regular follow-up exams. These are done to  check for signs of the cancer returning.  When to call your healthcare provider   Call your healthcare provider if you have any of the below:  · Swelling or bleeding at the incision site  · Fever of 100.4°F (38.0°C) or higher, or as advised by your healthcare provider  · A sore throat that lasts longer than 3 weeks  · Tingling or cramps in the hands, feet, or lips   Date Last Reviewed: 11/1/2016  © 9284-6768 [employer]. [address] [place] [address]. All rights reserved. This information is not intended as a substitute for professional medical care. Always follow your healthcare professional's instructions.

## 2019-04-12 ENCOUNTER — OFFICE VISIT (OUTPATIENT)
Dept: OTOLARYNGOLOGY | Facility: CLINIC | Age: 64
End: 2019-04-12
Payer: COMMERCIAL

## 2019-04-12 DIAGNOSIS — Z09 POSTOP CHECK: ICD-10-CM

## 2019-04-12 DIAGNOSIS — C73 FOLLICULAR CARCINOMA OF THYROID: Primary | ICD-10-CM

## 2019-04-12 PROCEDURE — 99024 POSTOP FOLLOW-UP VISIT: CPT | Mod: 95,,, | Performed by: OTOLARYNGOLOGY

## 2019-04-12 PROCEDURE — 99024 PR POST-OP FOLLOW-UP VISIT: ICD-10-PCS | Mod: 95,,, | Performed by: OTOLARYNGOLOGY

## 2019-04-12 PROCEDURE — 99211 OFF/OP EST MAY X REQ PHY/QHP: CPT | Performed by: OTOLARYNGOLOGY

## 2019-04-12 NOTE — OP NOTE
Date of Operation: 04/12/2019    Surgeon: CICI MCCANN     Assistants: Bassam Martinez (RES)    Anesthesia: General endotracheal anesthesia    ASA Class: 1    Preoperative Diagnosis:   1) Follicular thyroid carcinoma, left lobe  2) Hashimoto's thyroiditis    Postoperative diagnosis:  1) Follicular thyroid carcinoma, left lobe  2) Hashimoto's thyroiditis    Procedures performed:  1) Completion thyroidectomy (right)    Closing Set: No    Indications for operation:  Rena Del Valle is a 64 y.o. female with a recently diagnosed follicular carcinoma in the left thyroid lobe after lobectomy. She has a background diagnosis of Hashimoto's thyroiditis and, with that in mind, along with the potential need for GUTHRIE therapy, we discussed her options of active surveillance versus completion thyroidectomy. She elected for completion thyroidectomy after independent research and thorough discussions.     The risks, benefits, indications, and alternatives to this procedure were thoroughly discussed with the patient preoperatively, and informed consent was obtained. Please see my detailed preoperative notes for a thorough account of this discussion.    Findings:   The right lobe was normal. The right recurrent laryngeal nerve was identified and preserved. The right one stimulated at 0.8 milliampere of stimulus on the nerve integrity monitor system to over 400 microvolts. The superior parathyroid gland was definitively identified and preserved.    EBL: 30mL    IVF:  May be found in the operative record      Detailed Account of Technique Employed:  The patient was identified in the holding area per routine. She was transported to the Operating Room and placed supine on the operating table. She was intubated transorally with the nerve monitoring endotracheal tube and an adequate level of general endotracheal anesthesia was achieved. Adequate functioning of the nerve monitoring tube was determined by positive responses when the patient  became light with anesthesia, as there was evidence of neuromuscular activity, and with direct palpation of the thyroid ala, which gave an appropriate stimulus response on the side of palpation. The anterior neck and chest were then prepped and draped in the standard fashion. A timeout was performed according to the Universal protocol. Her existing incision was then marked and injected with 1% lidocaine with 1:100,000 epinephrine.     The incision was then made, excising the old scar, through the skin and subcutaneous tissues. The platysma was divided laterally and the superficial layer of cervical fascia divided in the midline. Subplatysmal flaps were elevated to the thyroid notch superiorly and to the sternal notch inferiorly, within the relatively fresh scar tissue. There was some old surgicel evident in the midline which was removed. The median raphe between the strap muscles was identified and . The straps were then elevated off the right thyroid lobe. The superior pole was identified. The cricothyroid space was developed. The superior pedicle was then doubly clamped, divided and ligated with 2-0 and 3-0 silk after visualizing branches of the external branch of the superior laryngeal nerve. The gland was rotated medially. Multiple fibrovascular attachments of the gland in the region of the middle thyroid vein were then either clipped or controlled with a bipolar. The gland was rotated even further medially. The tubercle of Zuckerkandl was identified, and the superior parathyroid gland was identified and swept laterally. A capsular dissection was performed, and the inferior parathyroid gland was not definitively seen. The recurrent nerve was identified coursing obliquely through the right paratracheal gutter, just deep to the tubercle, and it was dissected both distally and proximally towards the cricothyroid joint, dividing the intervening fibrovascular attachments of the gland to the central neck.  There was minimal infiltration of Berry's ligament by thyroid tissue, and the ligament was divided over the recurrent nerve. The anterior fibrovascular attachments of the gland to the trachea were then divided with the bipolar and/or small surgical clips and it was elevated off the trachea until we communicated with the prior surgical site. Distal branches of the inferior thyroid artery were controlled with either small surgical clips, 3-0 ties or the bipolar as the right lobe was rotated medially.     Both recurrent nerves were then stimulated with the nerve integrity monitor system at 1 milliampere of stimulus intensity as noted above.    The wound was copiously irrigated with saline and additional hemostasis was achieved as necessary with focal application of the bipolar cautery. Some Surgicel was placed in the paratracheal gutter. Multiple Valsalva maneuvers were performed as noted above. The straps were not reapproximated. Interrupted 3-0 Vicryl was used in the platysma, followed by deep dermal buried sutures of 4-0 Monocryl. Dermabond was applied to the skin. The patient was then allowed to awaken from anesthesia, extubated and transported to the Recovery Room in stable condition.     All sponge, needle and instrument counts were correct at the end of case x2.     I was present for and performed all portions of this operation.

## 2019-04-15 ENCOUNTER — PATIENT MESSAGE (OUTPATIENT)
Dept: OTOLARYNGOLOGY | Facility: CLINIC | Age: 64
End: 2019-04-15

## 2019-04-18 ENCOUNTER — PATIENT MESSAGE (OUTPATIENT)
Dept: OTOLARYNGOLOGY | Facility: CLINIC | Age: 64
End: 2019-04-18

## 2019-04-21 NOTE — BRIEF OP NOTE
[facility]  Brief Operative Note     SUMMARY     Surgery Date: 4/10/2019     Surgeon(s) and Role:     * Luis A Barron MD - Primary     * Bassam Martinez MD - Resident - Assisting        Pre-op Diagnosis:  Follicular carcinoma of thyroid [C73]    Post-op Diagnosis:  Post-Op Diagnosis Codes:     * Follicular carcinoma of thyroid [C73]    Procedure(s) (LRB):  THYROIDECTOMY (Right)    Anesthesia: General    Description of the findings of the procedure: Normal right lobe    Findings/Key Components: Normal right lobe, intact RLN    Estimated Blood Loss: 20mL         Specimens:   Specimen (12h ago, onward)    None          Discharge Note    SUMMARY     Admit Date: 4/10/2019    Discharge Date and Time: 4/10/2019 1600    Hospital Course (synopsis of major diagnoses, care, treatment, and services provided during the course of the hospital stay): Tolerated procedure well. Postop PTH was acceptable. TOlerated PO and met discharge criteria. FU video visit in 2 days.     Final Diagnosis: Post-Op Diagnosis Codes:     * Follicular carcinoma of thyroid [C73]    Disposition: Home or Self Care    Follow Up/Patient Instructions:     Medications:  Reconciled Home Medications:      Medication List      START taking these medications    calcium carbonate 400 mg calcium (1,000 mg) Chew  Commonly known as:  TUMS ULTRA  Take 1 tablet (400 mg total) by mouth 3 (three) times daily for 7 days, THEN 1 tablet (400 mg total) 2 (two) times daily for 7 days, THEN 1 tablet (400 mg total) once daily for 7 days, THEN 0.5 tablets (200 mg total) once daily for 7 days.  Start taking on:  4/10/2019     levothyroxine 112 MCG tablet  Commonly known as:  SYNTHROID  Take 1 tablet (112 mcg total) by mouth before breakfast.     oxyCODONE-acetaminophen  mg per tablet  Commonly known as:  PERCOCET  Take 1 tablet by mouth every 4 (four) hours as needed for Pain.        CONTINUE taking these medications    candesartan 16 MG  tablet  Commonly known as:  ATACAND  Take 16 mg by mouth once daily.     doxycycline 50 MG capsule  Commonly known as:  VIBRAMYCIN  Take 50 mg by mouth every evening.     estradiol 0.05 mg/24 hr td ptsw 0.05 mg/24 hr  Commonly known as:  VIVELLE-DOT     pantoprazole 40 MG tablet  Commonly known as:  PROTONIX     progesterone 100 MG capsule  Commonly known as:  PROMETRIUM     SOOLANTRA 1 % Crea  Generic drug:  ivermectin     spironolactone-hydrochlorothiazide 50-50 mg per tablet  Commonly known as:  ALDACTAZIDE  Take 1 tablet by mouth every Mon, Wed, Fri.        ASK your doctor about these medications    ondansetron 8 MG Tbdl  Commonly known as:  ZOFRAN-ODT  Take 1 tablet (8 mg total) by mouth every 6 (six) hours as needed (nausea).  Ask about: Should I take this medication?          Discharge Procedure Orders   Diet Adult Regular     Lifting restrictions     Notify your health care provider if you experience any of the following:  persistent nausea and vomiting or diarrhea     Notify your health care provider if you experience any of the following:  severe uncontrolled pain     Notify your health care provider if you experience any of the following:  redness, tenderness, or signs of infection (pain, swelling, redness, odor or green/yellow discharge around incision site)     Notify your health care provider if you experience any of the following:  difficulty breathing or increased cough     No dressing needed     Follow-up Information     Luis A Barron MD. Schedule an appointment as soon as possible for a visit in 2 days.    Specialty:  Otolaryngology  Why:  For post-op visit; telemedicine  Contact information:  [facility] [facility] [address]  [phone]

## 2019-04-30 ENCOUNTER — OFFICE VISIT (OUTPATIENT)
Dept: ENDOCRINOLOGY | Facility: CLINIC | Age: 64
End: 2019-04-30
Payer: COMMERCIAL

## 2019-04-30 VITALS
DIASTOLIC BLOOD PRESSURE: 80 MMHG | WEIGHT: 239.63 LBS | HEIGHT: 72 IN | BODY MASS INDEX: 32.46 KG/M2 | SYSTOLIC BLOOD PRESSURE: 124 MMHG

## 2019-04-30 DIAGNOSIS — C73 FOLLICULAR CARCINOMA OF THYROID: Primary | ICD-10-CM

## 2019-04-30 DIAGNOSIS — E89.0 POSTOPERATIVE HYPOTHYROIDISM: ICD-10-CM

## 2019-04-30 PROCEDURE — 99999 PR PBB SHADOW E&M-EST. PATIENT-LVL III: CPT | Mod: PBBFAC,,, | Performed by: INTERNAL MEDICINE

## 2019-04-30 PROCEDURE — 3008F PR BODY MASS INDEX (BMI) DOCUMENTED: ICD-10-PCS | Mod: CPTII,S$GLB,, | Performed by: INTERNAL MEDICINE

## 2019-04-30 PROCEDURE — 99204 PR OFFICE/OUTPT VISIT, NEW, LEVL IV, 45-59 MIN: ICD-10-PCS | Mod: S$GLB,,, | Performed by: INTERNAL MEDICINE

## 2019-04-30 PROCEDURE — 99204 OFFICE O/P NEW MOD 45 MIN: CPT | Mod: S$GLB,,, | Performed by: INTERNAL MEDICINE

## 2019-04-30 PROCEDURE — 99999 PR PBB SHADOW E&M-EST. PATIENT-LVL III: ICD-10-PCS | Mod: PBBFAC,,, | Performed by: INTERNAL MEDICINE

## 2019-04-30 PROCEDURE — 3008F BODY MASS INDEX DOCD: CPT | Mod: CPTII,S$GLB,, | Performed by: INTERNAL MEDICINE

## 2019-04-30 NOTE — PROGRESS NOTES
Subjective:      Patient ID: Rena Del Valle is a 64 y.o. female.    Chief Complaint:  Thyroid Cancer      History of Present Illness  Ms. Del Valle presents for evaluation and management of thyroid cancer and postoperative hypothyroidism.     Has active history of hypothyroidism, thyroid cancer and hypertension.     First noted to have Hashimoto's last fall with normal thyroid function. This prompted a thyroid ultrasound in 10/2018 which then showed a left sided nodule.   The nodule was solid, hypoechoic measuring 2.33 x 1.44 x 1.35 cm. It was then recommended that she undergo FNA. The FNA showed AUS so Affirma testing was done which increased her malignancy risk to 50%.      She has no family history of thyroid cancer or personal history of head/neck irradiation.     Underwent left lobectomy and isthmusectomy by Dr. Barron on 2/27/2019:  Procedure: Left lobectomy  Tumor focality: Unifocal  Tumor site: Left  Tumor size: 1.8 x 1.0 x 0.8 cm  Histologic type: Follicular carcinoma, minimally invasive  Margins:  Uninvolved by carcinoma  Distance of invasive carcinoma from closest margin: 1 mm  Angio invasion: Not identified  Lymphatic invasion: Not identified  Extrathyroidal extension: Not identified  Regional lymph nodes  Number of lymph nodes involved: 0  Number of lymph nodes examined: 3  Pathologic staging: pT1b N0 MX  Outside ultrasound 10/2018 left lobe thyroid nodule.     Underwent completion thyroidectomy with Dr. Barron on 4/10/2019:  1. Next scar (excision):  Benign skin with scarring and reactive changes  2. Right inferior neck (excision):  Benign adipose tissue and lymphoid tissue  3. Thyroid, right lobe (completion thyroidectomy):  Benign thyroid with lymphocytic thyroiditis  Reactive changes including foreign body giant cell reaction, consistent with recent surgery  No neoplasm identified    No dysphagia or hoarseness. Some mild voice changes since the surgery.     Currently taking 112 mcg once daily.   Has  noticed a bit more fatigue recently. No cold intolerance. Mood normal. Weight has been stable.  No tremor or palpitations.    She did not develop hypocalcemia or hypopara post op.       Review of Systems   Constitutional: Negative for unexpected weight change.   HENT: Negative for voice change.    Eyes: Negative for visual disturbance.   Respiratory: Negative for shortness of breath.    Cardiovascular: Negative for chest pain.   Gastrointestinal: Negative for abdominal pain.   Endocrine: Negative for cold intolerance.   Genitourinary: Negative for frequency.   Musculoskeletal: Negative for myalgias.   Skin: Negative for rash.       Objective:   Physical Exam   Constitutional: She is oriented to person, place, and time. She appears well-developed and well-nourished.   HENT:   Head: Normocephalic and atraumatic.   Right Ear: External ear normal.   Left Ear: External ear normal.   Nose: Nose normal.   Neck: No tracheal deviation present.   No thyroid tissue palpated, well healing thyroidectomy scar   Cardiovascular: Normal rate, regular rhythm and normal heart sounds.   No edema   Pulmonary/Chest: Effort normal and breath sounds normal.   Abdominal: Soft. Bowel sounds are normal. There is no tenderness.   Musculoskeletal:   Normal gait, no cyanosis or clubbing   Neurological: She is alert and oriented to person, place, and time. She has normal reflexes.   Vibration sense intact   Skin: Skin is warm and dry. No rash noted.   No nodules, no ulcers   Psychiatric: She has a normal mood and affect. Judgment normal.   Vitals reviewed.      Lab Review:   Results for MONICA MARQUIS (MRN [id number]) as of 5/1/2019 09:08   Ref. Range 2/13/2019 16:30 2/21/2019 08:38 4/10/2019 12:49   Creatinine Latest Ref Range: 0.5 - 1.4 mg/dL 0.9     eGFR if non African American Latest Ref Range: >60 mL/min/1.73 m^2 >60     eGFR if African American Latest Ref Range: >60 mL/min/1.73 m^2 >60     Calcium Latest Ref Range: 8.7 - 10.5 mg/dL  9.5     PTH Latest Ref Range: 9.0 - 77.0 pg/mL  81.0 (H) 33.0       Assessment:     1. Follicular carcinoma of thyroid    2. Postoperative hypothyroidism      Plan:     --Patient s/p thyroidectomy for Stage 1 follicular thyroid cancer, DEIRDRE low risk for recurrence  --Reviewed in detail follow up of thyroid cancer and treatment of hypothyroidism  --Discussed that the only role for GUTHRIE ablation in this case would be with a low dose to lower Tg for ease of following, but that not giving GUTHRIE is completely reasonable in this case  --Will await 6 wk post op Tg before deciding on GUTHRIE, advised her that we would also discuss her case at endocrine tumor board  --TSH goal <2.0  --She is interested in switching to brand name Synthroid, but will wait for TSH to come back later this month as her dose may need to be adjusted    Labs in 3 weeks    Nick Johnson M.D. Staff Endocrinology

## 2019-05-02 ENCOUNTER — PATIENT MESSAGE (OUTPATIENT)
Dept: ENDOCRINOLOGY | Facility: CLINIC | Age: 64
End: 2019-05-02

## 2019-05-02 DIAGNOSIS — C73 FOLLICULAR CARCINOMA OF THYROID: ICD-10-CM

## 2019-05-02 DIAGNOSIS — E89.0 POSTOPERATIVE HYPOTHYROIDISM: Primary | ICD-10-CM

## 2019-05-27 ENCOUNTER — LAB VISIT (OUTPATIENT)
Dept: LAB | Facility: HOSPITAL | Age: 64
End: 2019-05-27
Attending: INTERNAL MEDICINE
Payer: COMMERCIAL

## 2019-05-27 DIAGNOSIS — E89.0 POSTOPERATIVE HYPOTHYROIDISM: ICD-10-CM

## 2019-05-27 DIAGNOSIS — C73 FOLLICULAR CARCINOMA OF THYROID: ICD-10-CM

## 2019-05-27 LAB
ANION GAP SERPL CALC-SCNC: 8 MMOL/L (ref 8–16)
BUN SERPL-MCNC: 11 MG/DL (ref 8–23)
CALCIUM SERPL-MCNC: 8.7 MG/DL (ref 8.7–10.5)
CHLORIDE SERPL-SCNC: 109 MMOL/L (ref 95–110)
CO2 SERPL-SCNC: 25 MMOL/L (ref 23–29)
CREAT SERPL-MCNC: 0.9 MG/DL (ref 0.5–1.4)
EST. GFR  (AFRICAN AMERICAN): >60 ML/MIN/1.73 M^2
EST. GFR  (NON AFRICAN AMERICAN): >60 ML/MIN/1.73 M^2
GLUCOSE SERPL-MCNC: 98 MG/DL (ref 70–110)
POTASSIUM SERPL-SCNC: 4.5 MMOL/L (ref 3.5–5.1)
SODIUM SERPL-SCNC: 142 MMOL/L (ref 136–145)
TSH SERPL DL<=0.005 MIU/L-ACNC: 0.71 UIU/ML (ref 0.4–4)

## 2019-05-27 PROCEDURE — 84443 ASSAY THYROID STIM HORMONE: CPT

## 2019-05-27 PROCEDURE — 86800 THYROGLOBULIN ANTIBODY: CPT

## 2019-05-27 PROCEDURE — 36415 COLL VENOUS BLD VENIPUNCTURE: CPT | Mod: PO

## 2019-05-27 PROCEDURE — 80048 BASIC METABOLIC PNL TOTAL CA: CPT

## 2019-05-29 ENCOUNTER — PATIENT MESSAGE (OUTPATIENT)
Dept: ENDOCRINOLOGY | Facility: CLINIC | Age: 64
End: 2019-05-29

## 2019-05-29 LAB
THRYOGLOBULIN INTERPRETATION: ABNORMAL
THYROGLOB AB SERPL-ACNC: <1.8 IU/ML
THYROGLOB SERPL-MCNC: 0.3 NG/ML

## 2019-05-30 ENCOUNTER — PATIENT MESSAGE (OUTPATIENT)
Dept: RESEARCH | Facility: HOSPITAL | Age: 64
End: 2019-05-30

## 2019-05-31 ENCOUNTER — PATIENT MESSAGE (OUTPATIENT)
Dept: ENDOCRINOLOGY | Facility: CLINIC | Age: 64
End: 2019-05-31

## 2019-05-31 DIAGNOSIS — E89.0 POSTOPERATIVE HYPOTHYROIDISM: Primary | ICD-10-CM

## 2019-05-31 RX ORDER — LEVOTHYROXINE SODIUM 112 UG/1
TABLET ORAL
Qty: 96 TABLET | Refills: 3 | Status: SHIPPED | OUTPATIENT
Start: 2019-05-31 | End: 2019-08-04

## 2019-06-03 ENCOUNTER — PATIENT MESSAGE (OUTPATIENT)
Dept: ENDOCRINOLOGY | Facility: CLINIC | Age: 64
End: 2019-06-03

## 2019-06-21 ENCOUNTER — PATIENT MESSAGE (OUTPATIENT)
Dept: ENDOCRINOLOGY | Facility: CLINIC | Age: 64
End: 2019-06-21

## 2019-06-21 DIAGNOSIS — C73 FOLLICULAR CARCINOMA OF THYROID: ICD-10-CM

## 2019-06-21 DIAGNOSIS — E89.0 POSTOPERATIVE HYPOTHYROIDISM: Primary | ICD-10-CM

## 2019-06-27 ENCOUNTER — PATIENT MESSAGE (OUTPATIENT)
Dept: OTOLARYNGOLOGY | Facility: CLINIC | Age: 64
End: 2019-06-27

## 2019-06-28 ENCOUNTER — PATIENT MESSAGE (OUTPATIENT)
Dept: ENDOCRINOLOGY | Facility: CLINIC | Age: 64
End: 2019-06-28

## 2019-08-04 ENCOUNTER — PATIENT MESSAGE (OUTPATIENT)
Dept: ENDOCRINOLOGY | Facility: CLINIC | Age: 64
End: 2019-08-04

## 2019-08-04 DIAGNOSIS — E89.0 POSTOPERATIVE HYPOTHYROIDISM: Primary | ICD-10-CM

## 2019-08-04 DIAGNOSIS — C73 FOLLICULAR CARCINOMA OF THYROID: ICD-10-CM

## 2019-08-04 RX ORDER — LEVOTHYROXINE SODIUM 125 UG/1
125 TABLET ORAL DAILY
Qty: 90 TABLET | Refills: 3 | Status: SHIPPED | OUTPATIENT
Start: 2019-08-04 | End: 2019-10-04

## 2019-08-05 ENCOUNTER — PATIENT MESSAGE (OUTPATIENT)
Dept: ENDOCRINOLOGY | Facility: CLINIC | Age: 64
End: 2019-08-05

## 2019-08-15 ENCOUNTER — PATIENT MESSAGE (OUTPATIENT)
Dept: ENDOCRINOLOGY | Facility: CLINIC | Age: 64
End: 2019-08-15

## 2019-08-15 DIAGNOSIS — C73 FOLLICULAR CARCINOMA OF THYROID: Primary | ICD-10-CM

## 2019-10-03 PROBLEM — Z12.11 SCREEN FOR COLON CANCER: Status: ACTIVE | Noted: 2019-10-03

## 2019-10-04 ENCOUNTER — PATIENT MESSAGE (OUTPATIENT)
Dept: ENDOCRINOLOGY | Facility: CLINIC | Age: 64
End: 2019-10-04

## 2019-10-04 DIAGNOSIS — C73 FOLLICULAR CARCINOMA OF THYROID: Primary | ICD-10-CM

## 2019-10-04 DIAGNOSIS — E89.0 POSTOPERATIVE HYPOTHYROIDISM: ICD-10-CM

## 2019-10-04 RX ORDER — LEVOTHYROXINE SODIUM 137 UG/1
137 TABLET ORAL
Qty: 30 TABLET | Refills: 11 | Status: SHIPPED | OUTPATIENT
Start: 2019-10-04 | End: 2019-11-15

## 2019-11-04 ENCOUNTER — PATIENT MESSAGE (OUTPATIENT)
Dept: ENDOCRINOLOGY | Facility: CLINIC | Age: 64
End: 2019-11-04

## 2019-11-04 DIAGNOSIS — C73 FOLLICULAR CARCINOMA OF THYROID: Primary | ICD-10-CM

## 2019-11-04 DIAGNOSIS — E89.0 POSTOPERATIVE HYPOTHYROIDISM: ICD-10-CM

## 2019-11-05 ENCOUNTER — PATIENT MESSAGE (OUTPATIENT)
Dept: ENDOCRINOLOGY | Facility: CLINIC | Age: 64
End: 2019-11-05

## 2019-11-15 ENCOUNTER — PATIENT MESSAGE (OUTPATIENT)
Dept: ENDOCRINOLOGY | Facility: CLINIC | Age: 64
End: 2019-11-15

## 2019-11-15 DIAGNOSIS — E89.0 POSTOPERATIVE HYPOTHYROIDISM: Primary | ICD-10-CM

## 2019-11-15 RX ORDER — LEVOTHYROXINE SODIUM 150 MCG
150 TABLET ORAL DAILY
Qty: 30 TABLET | Refills: 11 | Status: SHIPPED | OUTPATIENT
Start: 2019-11-15 | End: 2019-12-24 | Stop reason: SDUPTHER

## 2019-11-18 ENCOUNTER — TELEPHONE (OUTPATIENT)
Dept: ENDOCRINOLOGY | Facility: CLINIC | Age: 64
End: 2019-11-18

## 2019-11-18 ENCOUNTER — PATIENT MESSAGE (OUTPATIENT)
Dept: ENDOCRINOLOGY | Facility: CLINIC | Age: 64
End: 2019-11-18

## 2019-11-18 DIAGNOSIS — C73 FOLLICULAR CARCINOMA OF THYROID: Primary | ICD-10-CM

## 2019-12-24 ENCOUNTER — PATIENT MESSAGE (OUTPATIENT)
Dept: ENDOCRINOLOGY | Facility: CLINIC | Age: 64
End: 2019-12-24

## 2019-12-24 DIAGNOSIS — E89.0 POSTOPERATIVE HYPOTHYROIDISM: Primary | ICD-10-CM

## 2019-12-24 RX ORDER — LEVOTHYROXINE SODIUM 150 MCG
TABLET ORAL
Qty: 30 TABLET | Refills: 11
Start: 2019-12-24 | End: 2020-03-09 | Stop reason: SDUPTHER

## 2020-02-07 ENCOUNTER — PATIENT MESSAGE (OUTPATIENT)
Dept: ENDOCRINOLOGY | Facility: CLINIC | Age: 65
End: 2020-02-07

## 2020-02-07 DIAGNOSIS — E89.0 POSTOPERATIVE HYPOTHYROIDISM: ICD-10-CM

## 2020-02-07 DIAGNOSIS — C73 FOLLICULAR CARCINOMA OF THYROID: Primary | ICD-10-CM

## 2020-03-09 ENCOUNTER — PATIENT MESSAGE (OUTPATIENT)
Dept: ENDOCRINOLOGY | Facility: CLINIC | Age: 65
End: 2020-03-09

## 2020-03-09 DIAGNOSIS — C73 FOLLICULAR CARCINOMA OF THYROID: Primary | ICD-10-CM

## 2020-03-09 RX ORDER — LEVOTHYROXINE SODIUM 150 MCG
TABLET ORAL
Qty: 90 TABLET | Refills: 3 | Status: SHIPPED | OUTPATIENT
Start: 2020-03-09 | End: 2020-04-23 | Stop reason: SDUPTHER

## 2020-03-10 ENCOUNTER — PATIENT MESSAGE (OUTPATIENT)
Dept: ENDOCRINOLOGY | Facility: CLINIC | Age: 65
End: 2020-03-10

## 2020-03-23 ENCOUNTER — PATIENT MESSAGE (OUTPATIENT)
Dept: ENDOCRINOLOGY | Facility: CLINIC | Age: 65
End: 2020-03-23

## 2020-04-15 ENCOUNTER — PATIENT MESSAGE (OUTPATIENT)
Dept: ENDOCRINOLOGY | Facility: CLINIC | Age: 65
End: 2020-04-15

## 2020-04-23 ENCOUNTER — PATIENT MESSAGE (OUTPATIENT)
Dept: ENDOCRINOLOGY | Facility: CLINIC | Age: 65
End: 2020-04-23

## 2020-04-23 DIAGNOSIS — E89.0 POSTOPERATIVE HYPOTHYROIDISM: ICD-10-CM

## 2020-04-23 DIAGNOSIS — C73 FOLLICULAR CARCINOMA OF THYROID: Primary | ICD-10-CM

## 2020-04-23 RX ORDER — LEVOTHYROXINE SODIUM 150 MCG
150 TABLET ORAL
Qty: 90 TABLET | Refills: 3 | Status: SHIPPED | OUTPATIENT
Start: 2020-04-23 | End: 2021-03-22 | Stop reason: SDUPTHER

## 2020-07-13 ENCOUNTER — PATIENT MESSAGE (OUTPATIENT)
Dept: ENDOCRINOLOGY | Facility: CLINIC | Age: 65
End: 2020-07-13

## 2020-07-13 DIAGNOSIS — E89.0 POSTOPERATIVE HYPOTHYROIDISM: ICD-10-CM

## 2020-07-13 DIAGNOSIS — C73 FOLLICULAR CARCINOMA OF THYROID: Primary | ICD-10-CM

## 2020-10-06 ENCOUNTER — OFFICE VISIT (OUTPATIENT)
Dept: ENDOCRINOLOGY | Facility: CLINIC | Age: 65
End: 2020-10-06
Payer: COMMERCIAL

## 2020-10-06 ENCOUNTER — LAB VISIT (OUTPATIENT)
Dept: LAB | Facility: HOSPITAL | Age: 65
End: 2020-10-06
Attending: INTERNAL MEDICINE
Payer: COMMERCIAL

## 2020-10-06 ENCOUNTER — HOSPITAL ENCOUNTER (OUTPATIENT)
Dept: ENDOCRINOLOGY | Facility: CLINIC | Age: 65
Discharge: HOME | End: 2020-10-06
Attending: INTERNAL MEDICINE
Payer: COMMERCIAL

## 2020-10-06 ENCOUNTER — PATIENT MESSAGE (OUTPATIENT)
Dept: ENDOCRINOLOGY | Facility: CLINIC | Age: 65
End: 2020-10-06

## 2020-10-06 VITALS
HEIGHT: 72 IN | DIASTOLIC BLOOD PRESSURE: 78 MMHG | SYSTOLIC BLOOD PRESSURE: 128 MMHG | WEIGHT: 240.06 LBS | BODY MASS INDEX: 32.52 KG/M2

## 2020-10-06 DIAGNOSIS — E89.0 POSTOPERATIVE HYPOTHYROIDISM: ICD-10-CM

## 2020-10-06 DIAGNOSIS — C73 FOLLICULAR CARCINOMA OF THYROID: ICD-10-CM

## 2020-10-06 DIAGNOSIS — C73 FOLLICULAR CARCINOMA OF THYROID: Primary | ICD-10-CM

## 2020-10-06 DIAGNOSIS — I10 ESSENTIAL HYPERTENSION: ICD-10-CM

## 2020-10-06 LAB
T4 FREE SERPL-MCNC: 1.38 NG/DL (ref 0.71–1.51)
TSH SERPL DL<=0.005 MIU/L-ACNC: 0.39 UIU/ML (ref 0.4–4)

## 2020-10-06 PROCEDURE — 84432 ASSAY OF THYROGLOBULIN: CPT

## 2020-10-06 PROCEDURE — 99999 PR PBB SHADOW E&M-EST. PATIENT-LVL IV: ICD-10-PCS | Mod: PBBFAC,,, | Performed by: INTERNAL MEDICINE

## 2020-10-06 PROCEDURE — 76536 US SOFT TISSUE HEAD NECK THYROID: ICD-10-PCS | Mod: S$GLB,,, | Performed by: INTERNAL MEDICINE

## 2020-10-06 PROCEDURE — 99214 OFFICE O/P EST MOD 30 MIN: CPT | Mod: S$GLB,,, | Performed by: INTERNAL MEDICINE

## 2020-10-06 PROCEDURE — 3008F PR BODY MASS INDEX (BMI) DOCUMENTED: ICD-10-PCS | Mod: CPTII,S$GLB,, | Performed by: INTERNAL MEDICINE

## 2020-10-06 PROCEDURE — 3008F BODY MASS INDEX DOCD: CPT | Mod: CPTII,S$GLB,, | Performed by: INTERNAL MEDICINE

## 2020-10-06 PROCEDURE — 76536 US EXAM OF HEAD AND NECK: CPT | Mod: S$GLB,,, | Performed by: INTERNAL MEDICINE

## 2020-10-06 PROCEDURE — 99999 PR PBB SHADOW E&M-EST. PATIENT-LVL IV: CPT | Mod: PBBFAC,,, | Performed by: INTERNAL MEDICINE

## 2020-10-06 PROCEDURE — 84439 ASSAY OF FREE THYROXINE: CPT

## 2020-10-06 PROCEDURE — 99214 PR OFFICE/OUTPT VISIT, EST, LEVL IV, 30-39 MIN: ICD-10-PCS | Mod: S$GLB,,, | Performed by: INTERNAL MEDICINE

## 2020-10-06 PROCEDURE — 84443 ASSAY THYROID STIM HORMONE: CPT

## 2020-10-06 RX ORDER — METOPROLOL SUCCINATE 25 MG/1
25 TABLET, EXTENDED RELEASE ORAL DAILY
Qty: 30 TABLET | Refills: 11
Start: 2020-10-06 | End: 2023-08-28

## 2020-10-06 RX ORDER — CANDESARTAN 8 MG/1
8 TABLET ORAL DAILY
Qty: 90 TABLET | Refills: 3
Start: 2020-10-06 | End: 2023-08-28

## 2020-10-06 NOTE — PROGRESS NOTES
Subjective:      Patient ID: Rena Del Valle is a 65 y.o. female.    Chief Complaint:  Hypothyroidism and Thyroid Cancer      History of Present Illness  Ms. Del Valle presents for evaluation and management of thyroid cancer and postoperative hypothyroidism. Last visit 4/2019.    Has active history of hypothyroidism, thyroid cancer and hypertension.      First noted to have Hashimoto's last fall with normal thyroid function. This prompted a thyroid ultrasound in 10/2018 which then showed a left sided nodule.   The nodule was solid, hypoechoic measuring 2.33 x 1.44 x 1.35 cm. It was then recommended that she undergo FNA. The FNA showed AUS so Affirma testing was done which increased her malignancy risk to 50%.       She has no family history of thyroid cancer or personal history of head/neck irradiation.      Underwent left lobectomy and isthmusectomy by Dr. Barron on 2/27/2019:  Procedure: Left lobectomy  Tumor focality: Unifocal  Tumor site: Left  Tumor size: 1.8 x 1.0 x 0.8 cm  Histologic type: Follicular carcinoma, minimally invasive  Margins:  Uninvolved by carcinoma  Distance of invasive carcinoma from closest margin: 1 mm  Angio invasion: Not identified  Lymphatic invasion: Not identified  Extrathyroidal extension: Not identified  Regional lymph nodes  Number of lymph nodes involved: 0  Number of lymph nodes examined: 3  Pathologic staging: pT1b N0 MX  Outside ultrasound 10/2018 left lobe thyroid nodule.      Underwent completion thyroidectomy with Dr. Barron on 4/10/2019:  1. Next scar (excision):  Benign skin with scarring and reactive changes  2. Right inferior neck (excision):  Benign adipose tissue and lymphoid tissue  3. Thyroid, right lobe (completion thyroidectomy):  Benign thyroid with lymphocytic thyroiditis  Reactive changes including foreign body giant cell reaction, consistent with recent surgery  No neoplasm identified    No voice changes, dysphagia or hoarseness.      Currently taking Synthroid 150  mcg PO daily  Takes at bedtime 2 hrs after eating  No overt fatigue. BM's regular. No excessive hair loss.   No tremor. Has occasional palpitations and is followed by Dr. Mc Rangel with cardiology.     Thyroid ultrasound dated 4/22/2020:   1. No subcutaneous fluid collections are appreciated.  2. There are some well-defined subcutaneous lymph nodes present bilaterally.  3. No interval soft tissue or mass effect is appreciated at the thyroid fossa clinical area of question with history of previous thyroidectomy.    After our last visit she was seen at [facility] for a second opinion in 10/2019. There recommendation was that she does not need GUTHRIE ablation at this time. They recommended checking neck ultrasound and Tg every 6 months for the first two years, then yearly thereafter is stable.          Review of Systems   Constitutional: Negative for chills and fever.   Gastrointestinal: Negative for nausea.   No CP  No SOB    Objective:   Physical Exam  Vitals signs and nursing note reviewed.     No thyroid tissue palpated  No tremor  DTR's 2+ at the knee  Lungs CTA b/l    BP Readings from Last 3 Encounters:   10/06/20 128/78   10/03/19 (!) 107/56   04/30/19 124/80     Wt Readings from Last 1 Encounters:   10/06/20 0843 108.9 kg (240 lb 1.3 oz)       Body mass index is 31.67 kg/m².    Lab Review:   No results found for: HGBA1C  No results found for: CHOL, HDL, LDLCALC, TRIG, CHOLHDL  Lab Results   Component Value Date     05/27/2019    K 4.5 05/27/2019     05/27/2019    CO2 25 05/27/2019    GLU 98 05/27/2019    BUN 11 05/27/2019    CREATININE 0.9 05/27/2019    CALCIUM 8.7 05/27/2019    ANIONGAP 8 05/27/2019    ESTGFRAFRICA >60.0 05/27/2019    EGFRNONAA >60.0 05/27/2019    TSH 2.160 04/22/2020         Assessment and Plan     Follicular carcinoma of thyroid  --Patient s/p thyroidectomy for Stage 1 follicular thyroid cancer, DEIRDRE low risk for recurrence  --Did not receive GUTHRIE ablation  --Tg levels remain  stable and minimally detectable  --No evidence of disease on neck ultrasound  --Will repeat TSH and Tg now    Postoperative hypothyroidism  --Clinically and biochemically euthyroid  --Repeat TSH pending  --Continue Synthroid 150 mcg once daily pending repeat TSH    Essential hypertension  --Bp at goal on current regimen      RTC in 6 months with labs and ultrasound prior to appt      Nick Johnson M.D. Staff Endocrinology

## 2020-10-06 NOTE — ASSESSMENT & PLAN NOTE
--Patient s/p thyroidectomy for Stage 1 follicular thyroid cancer, DEIRDRE low risk for recurrence  --Did not receive GUTHRIE ablation  --Tg levels remain stable and minimally detectable  --No evidence of disease on neck ultrasound  --Will repeat TSH and Tg now

## 2020-10-06 NOTE — ASSESSMENT & PLAN NOTE
--Clinically and biochemically euthyroid  --Repeat TSH pending  --Continue Synthroid 150 mcg once daily pending repeat TSH

## 2020-10-07 LAB
THRYOGLOBULIN INTERPRETATION: ABNORMAL
THYROGLOB AB SERPL-ACNC: <1.8 IU/ML
THYROGLOB SERPL-MCNC: 0.4 NG/ML

## 2021-03-22 DIAGNOSIS — C73 FOLLICULAR CARCINOMA OF THYROID: Primary | ICD-10-CM

## 2021-03-22 RX ORDER — LEVOTHYROXINE SODIUM 150 MCG
150 TABLET ORAL
Qty: 90 TABLET | Refills: 3 | Status: SHIPPED | OUTPATIENT
Start: 2021-03-22 | End: 2021-06-04

## 2021-04-16 ENCOUNTER — PATIENT MESSAGE (OUTPATIENT)
Dept: ENDOCRINOLOGY | Facility: CLINIC | Age: 66
End: 2021-04-16

## 2021-05-10 ENCOUNTER — PATIENT MESSAGE (OUTPATIENT)
Dept: RESEARCH | Facility: HOSPITAL | Age: 66
End: 2021-05-10

## 2021-06-11 DIAGNOSIS — C73 FOLLICULAR CARCINOMA OF THYROID: ICD-10-CM

## 2021-06-11 RX ORDER — LEVOTHYROXINE SODIUM 150 UG/1
150 TABLET ORAL
Qty: 90 TABLET | Refills: 3 | Status: SHIPPED | OUTPATIENT
Start: 2021-06-11

## 2022-03-14 NOTE — PROGRESS NOTES
[facility] MEDICAL OFFICE CTR RADIATION ONCOLOGY  [address] [address]  [id number]  [facility] [address]  Dept Phone: [phone]    Patient Name: Jeffrey Holstein  MRN: [id number]    Radiation Oncology  OTV  Radiation Treatments     Active   prostate bed (Started on 3/10/2022)   Most recent fraction: 180 cGy given on 3/10/2022   Total given: 180 cGy / 2,520 cGy  (1 of 14 fractions)   Elapsed Days: 0   Technique: VMAT                    Subjective  Still has pain with BM. Started steroid foam on Friday. Has 4 BM per day. Using 1 imodium.   Physical Exam  NAD    Images reviewed  Plan:  Cont RT   Pain - continue steroids foam.   Patient is reluctant to take more imodium as harder BM are more  painful. Will plan to resim tomorrow without a rectal balloon. Will  continue with current plan until new plan Is available    Ekta Dill MD    Date: March 14, 2022   The patient location is:  Patient Home   The chief complaint leading to consultation is: postop thyroid  Visit type: Virtual visit with synchronous audio and video  Total time spent with patient: 6 minutes  Each patient to whom he or she provides medical services by telemedicine is:  (1) informed of the relationship between the physician and patient and the respective role of any other health care provider with respect to management of the patient; and (2) notified that he or she may decline to receive medical services by telemedicine and may withdraw from such care at any time.       Follicular carcinoma of thyroid    2/27/2019 Surgery     Left thyroid lobectomy and isthmusectomy         3/8/2019 Initial Diagnosis     Follicular carcinoma of thyroid         3/8/2019 Cancer Staged     Staging form: Thyroid - Differentiated, AJCC 8th Edition  - Clinical stage from 3/8/2019: Stage I (cT1b, cN0, cM0, Age at diagnosis: >= 55 years) - Signed by Luis A Barron MD on 4/12/2019         4/10/2019 Surgery     Right completion thyroidectomy            INTERVAL HISTORY:  Rena Del Valle presents today without complaints. Her voice is normal. She denies perioral or digital paresthesias or dysesthesias. There is no redness or drainage from the wound. She has no fever or chills. She is eating a regular diet.     Her incision looks good. No asymmetry or fullness. No redness.  Voice clear without diplophonia    A/P: Doing well - needs Endocrinology appointment to discuss GUTHRIE. Will order postop Tg.  - Will call with path

## 2024-07-12 ENCOUNTER — OFFICE VISIT (OUTPATIENT)
Dept: ENDOCRINOLOGY | Facility: CLINIC | Age: 69
End: 2024-07-12
Payer: MEDICARE

## 2024-07-12 DIAGNOSIS — E03.9 HYPOTHYROIDISM, UNSPECIFIED TYPE: ICD-10-CM

## 2024-07-12 DIAGNOSIS — C73 FOLLICULAR CARCINOMA OF THYROID: Primary | ICD-10-CM

## 2024-07-12 PROCEDURE — 99214 OFFICE O/P EST MOD 30 MIN: CPT | Mod: 95,,, | Performed by: INTERNAL MEDICINE

## 2024-07-12 PROCEDURE — G2211 COMPLEX E/M VISIT ADD ON: HCPCS | Mod: 95,,, | Performed by: INTERNAL MEDICINE

## 2024-07-12 RX ORDER — PANTOPRAZOLE SODIUM 40 MG/1
40 TABLET, DELAYED RELEASE ORAL DAILY
Qty: 90 TABLET | Refills: 3 | Status: SHIPPED | OUTPATIENT
Start: 2024-07-12

## 2024-07-12 NOTE — PROGRESS NOTES
CHIEF COMPLAINT: Follicular Thyroid Cancer  69 y.o. old being seen as a new patient to me. She initially had a left nodule that had FNA that showed AUS. Molecular markers came back at 50% risk.  She initially had a left lobectomy and isthmusectomy on 02/27/2019.  Had a completion thyroidectomy on 04/10/2019.  She did go to [facility] for a 2nd opinion.  It was recommended that she not get radioactive iodine and monitor with a thyroglobulin and thyroid ultrasound.    Currently on Synthroid 150 mcg daily. On brandname. Last thyroglobulin was 0.3 done at [facility] in August of 2022. No Palpitations. No tremors. No diarrhea or constipation. No difficulty swallowing. No heat/cold intolerance.       Underwent left lobectomy and isthmusectomy by Dr. Barron on 2/27/2019:  Procedure: Left lobectomy  Tumor focality: Unifocal  Tumor site: Left  Tumor size: 1.8 x 1.0 x 0.8 cm  Histologic type: Follicular carcinoma, minimally invasive  Margins:  Uninvolved by carcinoma  Distance of invasive carcinoma from closest margin: 1 mm  Angio invasion: Not identified  Lymphatic invasion: Not identified  Extrathyroidal extension: Not identified  Regional lymph nodes  Number of lymph nodes involved: 0  Number of lymph nodes examined: 3  Pathologic staging: pT1b N0 MX  Outside ultrasound 10/2018 left lobe thyroid nodule.      Underwent completion thyroidectomy with Dr. Barron on 4/10/2019:  1. Next scar (excision):  Benign skin with scarring and reactive changes  2. Right inferior neck (excision):  Benign adipose tissue and lymphoid tissue  3. Thyroid, right lobe (completion thyroidectomy):  Benign thyroid with lymphocytic thyroiditis  Reactive changes including foreign body giant cell reaction, consistent with recent surgery  No neoplasm identified         PAST MEDICAL HISTORY/PAST SURGICAL HISTORY:  Reviewed in [facility]    SOCIAL HISTORY: Reviewed in [facility]    FAMILY HISTORY:  No known thyroid cancer or thyroid disease. NO DM.      MEDICATIONS/ALLERGIES: The patient's MedCard has been updated and reviewed.            PE:    GENERAL: Well developed, well nourished.  NECK: Supple, trachea midline, no palpable lymphadenopathy  CHEST: Resp even and unlabored, CTA bilateral.  CARDIAC: RRR, S1, S2 heard, no murmurs, rubs, S3, or S4    LABS/Radiology    ASSESSMENT/PLAN:  1.  Follicular thyroid cancer-initially underwent a left lobectomy.  Subsequently had a completion thyroidectomy which showed no thyroid cancer on the contralateral lobe.  She went to [facility] for a 2nd opinion.  No radioactive iodine recommended.  Has been having thyroglobulin thyroid ultrasound monitored.  Check thyroglobulin thyroid ultrasound    2.  Hypothyroidism-currently on brand-name Synthroid.  Appears to be taking appropriately.  Check TSH.  Does not need TSH suppression.      FOLLOWUP  Needs TSH, Tg, THyroid Ultrasound.   F/U 1 yr with TSH, Tg, Thyroid Ultrasound

## 2024-07-15 ENCOUNTER — PATIENT MESSAGE (OUTPATIENT)
Dept: ENDOCRINOLOGY | Facility: CLINIC | Age: 69
End: 2024-07-15
Payer: MEDICARE

## 2024-07-29 ENCOUNTER — PATIENT MESSAGE (OUTPATIENT)
Dept: ENDOCRINOLOGY | Facility: CLINIC | Age: 69
End: 2024-07-29
Payer: MEDICARE

## 2024-07-29 DIAGNOSIS — C73 FOLLICULAR CARCINOMA OF THYROID: ICD-10-CM

## 2024-07-29 RX ORDER — LEVOTHYROXINE SODIUM 150 UG/1
150 TABLET ORAL
Qty: 90 TABLET | Refills: 3 | Status: SHIPPED | OUTPATIENT
Start: 2024-07-29

## 2024-11-13 ENCOUNTER — TELEPHONE (OUTPATIENT)
Dept: GASTROENTEROLOGY | Facility: CLINIC | Age: 69
End: 2024-11-13
Payer: MEDICARE

## 2024-11-13 NOTE — TELEPHONE ENCOUNTER
----- Message from Lorena sent at 11/13/2024 12:34 PM CST -----  Contact: PT  Type:  NP  Apoointment Request    Name of Caller:PT   When is the first available appointment?N/A  Symptoms: PT WAS REFERRED TO THE PROVIDER BY HER DAUGHTER  PT HAS A HISTORY PRE CANCEROUS POLYPS  PT WOULD LIKE TO BE SEEN ASAP AND SCHEDULE A COLONOSCOPY EARLY 2025  Would the patient rather a call back or a response via [facility]? CALL   Best Call Back Number:[phone]  Additional Information: THANK YOU

## 2024-11-13 NOTE — TELEPHONE ENCOUNTER
"Spoke with pt. Scheduled office visit as pt mentioned "minor GI issues" but needs a screening c-scope. Pt verbalized understanding to all.  "

## 2024-12-03 ENCOUNTER — OFFICE VISIT (OUTPATIENT)
Dept: GASTROENTEROLOGY | Facility: CLINIC | Age: 69
End: 2024-12-03
Payer: MEDICARE

## 2024-12-03 VITALS — WEIGHT: 238.56 LBS | HEIGHT: 72 IN | BODY MASS INDEX: 32.31 KG/M2

## 2024-12-03 DIAGNOSIS — R19.4 CHANGE IN BOWEL HABITS: Primary | ICD-10-CM

## 2024-12-03 DIAGNOSIS — R19.7 DIARRHEA, UNSPECIFIED TYPE: ICD-10-CM

## 2024-12-03 DIAGNOSIS — Z86.0100 HISTORY OF COLON POLYPS: ICD-10-CM

## 2024-12-03 DIAGNOSIS — R15.1 FECAL SMEARING: ICD-10-CM

## 2024-12-03 DIAGNOSIS — Z87.19 HISTORY OF GASTROESOPHAGEAL REFLUX (GERD): ICD-10-CM

## 2024-12-03 PROCEDURE — 99999 PR PBB SHADOW E&M-EST. PATIENT-LVL IV: CPT | Mod: PBBFAC,,, | Performed by: NURSE PRACTITIONER

## 2024-12-03 PROCEDURE — 99214 OFFICE O/P EST MOD 30 MIN: CPT | Mod: PBBFAC,PO | Performed by: NURSE PRACTITIONER

## 2024-12-03 PROCEDURE — 99204 OFFICE O/P NEW MOD 45 MIN: CPT | Mod: S$PBB,,, | Performed by: NURSE PRACTITIONER

## 2024-12-03 RX ORDER — PANTOPRAZOLE SODIUM 40 MG/1
40 TABLET, DELAYED RELEASE ORAL DAILY
Qty: 90 TABLET | Refills: 3 | Status: SHIPPED | OUTPATIENT
Start: 2024-12-03

## 2024-12-03 NOTE — PROGRESS NOTES
Subjective:       Patient ID: Rena Del Valle is a 69 y.o. female, Body mass index is 32.35 kg/m².    Chief Complaint: Establish Care (Requests colonoscopy,  using lots of toilet paper, some diarrhea and sometimes occurs more now using pullup at times)      Patient is new to me. Former patient of Dr. Galdamez.    Diarrhea   This is a chronic problem. The current episode started more than 1 month ago. The problem occurs 2 to 4 times per day. The problem has been unchanged. Diarrhea characteristics: describes stool as type 4-6 on bristol scale. The patient states that diarrhea does not awaken her from sleep. Pertinent negatives include no abdominal pain, bloating, chills, coughing, fever, increased  flatus, vomiting or weight loss. Associated symptoms comments: Associated symptoms include fecal urgency and incontinence. Nothing aggravates the symptoms. There are no known risk factors. She has tried nothing for the symptoms. There is no history of bowel resection, inflammatory bowel disease, irritable bowel syndrome or a recent abdominal surgery.     Review of Systems   Constitutional:  Negative for appetite change, chills, fever, unexpected weight change and weight loss.   HENT:  Negative for trouble swallowing.    Respiratory:  Negative for cough and shortness of breath.    Cardiovascular:  Negative for chest pain.   Gastrointestinal:  Positive for diarrhea. Negative for abdominal distention, abdominal pain, anal bleeding, bloating, blood in stool, constipation, flatus, nausea, rectal pain and vomiting.        Hx of GERD- taking Protonix 40 mg once daily   Genitourinary:  Negative for difficulty urinating and dysuria.   Musculoskeletal:  Negative for gait problem.   Skin:  Negative for rash.   Neurological:  Negative for speech difficulty.   Psychiatric/Behavioral:  Negative for confusion.        Past Medical History:   Diagnosis Date    Breast cyst     Diverticulitis     Encounter for blood transfusion      Follicular cancer of thyroid     thyroid    GERD (gastroesophageal reflux disease)     Hypertension     Nontoxic thyroid nodule 1/27/2019      Past Surgical History:   Procedure Laterality Date    BREAST BIOPSY Left 2011    benign needle biopsy    COLECTOMY      resection    COLONOSCOPY N/A 10/03/2019    Procedure: COLONOSCOPY;  Surgeon: Robi Galdamez MD;  Location: [facility];  Service: Endoscopy;  Laterality: N/A;    EYE SURGERY      SHOULDER SURGERY Right     THYROID LOBECTOMY Left 02/27/2019    Procedure: LOBECTOMY, THYROID;  Surgeon: Luis A Barron MD;  [facility];  Service: ENT;  Laterality: Left;  NIM tube  Possible total thyroidectomy    THYROIDECTOMY Right 04/10/2019    Procedure: THYROIDECTOMY;  Surgeon: Luis A Barron MD;  Location: [facility] OR [facility];  Service: ENT;  Laterality: Right;  NIM tube    UPPER GASTROINTESTINAL ENDOSCOPY        Family History   Problem Relation Name Age of Onset    Hypertension Mother      Heart disease Mother      Cancer Father      Kidney disease Father      Hypertension Father      Stroke Father      Hypertension Sister      Hypertension Brother      Heart disease Brother      Kidney disease Brother      Breast cancer Maternal Aunt          50's      Wt Readings from Last 10 Encounters:   12/03/24 108.2 kg (238 lb 8.6 oz)   09/30/24 106.1 kg (234 lb)   09/10/24 106.1 kg (234 lb)   08/14/24 106.6 kg (235 lb)   08/28/23 110.7 kg (244 lb)   08/15/23 111.6 kg (246 lb)   08/14/23 109.8 kg (242 lb)   04/24/23 110 kg (242 lb 8.1 oz)   08/11/22 109.3 kg (241 lb)   01/25/21 109.6 kg (241 lb 10 oz)     Lab Results   Component Value Date    WBC 6.8 08/03/2023    HGB 14.6 08/03/2023    HCT 43.0 08/03/2023    MCV 95.6 08/03/2023     08/03/2023     CMP  Sodium   Date Value Ref Range Status   09/26/2024 137 136 - 145 mmol/L Final     Potassium   Date Value Ref Range Status   09/26/2024 3.7 3.5 - 5.1 mmol/L Final     Comment:     Anion Gap  reference range revised on 4/28/2023     Chloride   Date Value Ref Range Status   09/26/2024 102 95 - 110 mmol/L Final     CO2   Date Value Ref Range Status   09/26/2024 26 22 - 31 mmol/L Final     Glucose   Date Value Ref Range Status   09/26/2024 112 (H) 70 - 110 mg/dL Final     Comment:     The ADA recommends the following guidelines for fasting glucose:    Normal:       less than 100 mg/dL    Prediabetes:  100 mg/dL to 125 mg/dL    Diabetes:     126 mg/dL or higher       BUN   Date Value Ref Range Status   09/26/2024 13 7 - 18 mg/dL Final     Creatinine   Date Value Ref Range Status   09/26/2024 0.93 0.50 - 1.40 mg/dL Final     Calcium   Date Value Ref Range Status   09/26/2024 8.9 8.4 - 10.2 mg/dL Final     Total Protein   Date Value Ref Range Status   08/14/2024 7.2 6.0 - 8.4 g/dL Final     Albumin   Date Value Ref Range Status   09/26/2024 4.1 3.5 - 5.2 g/dL Final     Total Bilirubin   Date Value Ref Range Status   08/14/2024 0.7 0.2 - 1.3 mg/dL Final     Alkaline Phosphatase   Date Value Ref Range Status   08/14/2024 55 38 - 145 U/L Final     AST   Date Value Ref Range Status   08/14/2024 32 14 - 36 U/L Final     ALT   Date Value Ref Range Status   08/14/2024 21 0 - 35 U/L Final     Anion Gap   Date Value Ref Range Status   09/26/2024 9 5 - 12 mmol/L Final     Comment:     Anion Gap reference range revised on 4/28/2023     eGFR if    Date Value Ref Range Status   05/27/2019 >60.0 >60 mL/min/1.73 m^2 Final     eGFR if non    Date Value Ref Range Status   05/27/2019 >60.0 >60 mL/min/1.73 m^2 Final     Comment:     Calculation used to obtain the estimated glomerular filtration  rate (eGFR) is the CKD-EPI equation.                 Reviewed prior medical records including endoscopy history (see surgical history).     Objective:      Physical Exam  Constitutional:       General: She is not in acute distress.     Appearance: She is well-developed.   HENT:      Head: Normocephalic.       Right Ear: Hearing normal.      Left Ear: Hearing normal.      Nose: Nose normal.      Mouth/Throat:      Mouth: No oral lesions.      Pharynx: Uvula midline.   Eyes:      General: Lids are normal.      Conjunctiva/sclera: Conjunctivae normal.      Pupils: Pupils are equal, round, and reactive to light.   Neck:      Trachea: Trachea normal.   Cardiovascular:      Rate and Rhythm: Normal rate and regular rhythm.      Heart sounds: Normal heart sounds. No murmur heard.  Pulmonary:      Effort: Pulmonary effort is normal. No respiratory distress.      Breath sounds: Normal breath sounds. No stridor. No wheezing.   Abdominal:      General: Bowel sounds are normal. There is no distension.      Palpations: Abdomen is soft. There is no mass.      Tenderness: There is no abdominal tenderness. There is no guarding or rebound.   Musculoskeletal:         General: Normal range of motion.      Cervical back: Normal range of motion.   Skin:     General: Skin is warm and dry.      Findings: No rash.      Comments: Non jaundiced   Neurological:      Mental Status: She is alert and oriented to person, place, and time.   Psychiatric:         Speech: Speech normal.         Behavior: Behavior normal. Behavior is cooperative.         Assessment:       1. Change in bowel habits    2. Diarrhea, unspecified type    3. Fecal smearing    4. History of gastroesophageal reflux (GERD)    5. History of colon polyps           Plan:   Patient agreed to sign medical release form to obtain records from Dr. Galdamez's office (EGD).    All diagnoses and orders for this visit:    Change in bowel habits & Diarrhea, unspecified type  - Stool Exam-Ova,Cysts,Parasites; Future; Expected date: 12/03/2024  - Giardia / Cryptosporidum, EIA; Future; Expected date: 12/03/2024  - Clostridium difficile EIA; Future; Expected date: 12/03/2024  - Stool culture; Future; Expected date: 12/03/2024  - Recommended increase fiber in diet, especially soluble fiber since this  can help bulk up the stool consistency and may help to slow down how fast the stool goes through the colon and can prevent diarrhea   - Recommend OTC fiber supplement (Benefiber)  - Schedule Colonoscopy     Fecal smearing  - Ambulatory referral/consult to Physical/Occupational Therapy; Future; Expected date: 12/10/2024 (Pelvic floor physical therapy)  - Recommend OTC fiber supplement (Benefiber)  - Consider referral to colorectal surgery if symptoms persist    History of gastroesophageal reflux (GERD)  - Refill and continue: pantoprazole (PROTONIX) 40 MG tablet; Take 1 tablet (40 mg total) by mouth once daily.  Dispense: 90 tablet; Refill: 3  - Take PPI in the morning 30 minutes before breakfast  - Recommend to avoid large meals, avoid eating within 3 hours of bedtime, elevate head of bed if nocturnal symptoms are present, smoking cessation (if current smoker), & weight loss (if overweight).   - Recommend minimize/avoid high-fat foods, chocolate, caffeine, citrus, alcohol, & tomato products.  - Advised to avoid/limit use of NSAID's, since they can cause GI upset, bleeding, and/or ulcers. If needed, take with food.      History of colon polyps   - Schedule Colonoscopy     If no improvement in symptoms or symptoms worsen, call/follow-up at clinic or go to ER

## 2025-04-04 ENCOUNTER — PATIENT MESSAGE (OUTPATIENT)
Dept: ENDOCRINOLOGY | Facility: CLINIC | Age: 70
End: 2025-04-04
Payer: MEDICARE

## 2025-05-23 ENCOUNTER — TELEPHONE (OUTPATIENT)
Dept: GASTROENTEROLOGY | Facility: CLINIC | Age: 70
End: 2025-05-23
Payer: MEDICARE

## 2025-05-23 NOTE — TELEPHONE ENCOUNTER
Called and spoke to pt, pt stated she was told she has gallstones and they are not active but that the gallstones are causing her to have a fatty liver. Advised pt she will have to see gen surg for gallbladder, pt stated she does not know if she wants to have surgery. Advised pt she will be scheduled for an office visit to further discuss gall bladder. Pt stated she has seen a gen surg dr in the past and going to look back at her medical records to see who she seem to schedule appt.   Pt verbalized understanding.

## 2025-05-23 NOTE — TELEPHONE ENCOUNTER
----- Message from Deejay sent at 5/23/2025 10:55 AM CDT -----  Regarding: Appointment Request  Contact: Pt  Type:  Sooner Appointment RequestCaller is requesting a sooner appointment.  Caller declined first available appointment listed below.  Caller will not accept being placed on the waitlist and is requesting a message be sent to doctor.Name of Caller:PtWhen is the first available appointment? naSymptoms:gallstoneWould the patient rather a call back or a response via [facility]? [facility] Back Number:[phone] Additional Information: pt needs apptPlease Advise Thank you!

## 2025-05-27 ENCOUNTER — OFFICE VISIT (OUTPATIENT)
Dept: SURGERY | Facility: CLINIC | Age: 70
End: 2025-05-27
Payer: MEDICARE

## 2025-05-27 VITALS
BODY MASS INDEX: 32.49 KG/M2 | HEART RATE: 94 BPM | HEIGHT: 72 IN | TEMPERATURE: 97 F | SYSTOLIC BLOOD PRESSURE: 96 MMHG | DIASTOLIC BLOOD PRESSURE: 58 MMHG | WEIGHT: 239.88 LBS

## 2025-05-27 DIAGNOSIS — K80.20 CALCULUS OF GALLBLADDER WITHOUT CHOLECYSTITIS WITHOUT OBSTRUCTION: Primary | ICD-10-CM

## 2025-05-27 PROCEDURE — 99999 PR PBB SHADOW E&M-EST. PATIENT-LVL III: CPT | Mod: PBBFAC,,, | Performed by: STUDENT IN AN ORGANIZED HEALTH CARE EDUCATION/TRAINING PROGRAM

## 2025-05-27 PROCEDURE — 99213 OFFICE O/P EST LOW 20 MIN: CPT | Mod: PBBFAC,PO | Performed by: STUDENT IN AN ORGANIZED HEALTH CARE EDUCATION/TRAINING PROGRAM

## 2025-05-27 PROCEDURE — 99203 OFFICE O/P NEW LOW 30 MIN: CPT | Mod: S$PBB,,, | Performed by: STUDENT IN AN ORGANIZED HEALTH CARE EDUCATION/TRAINING PROGRAM

## 2025-05-27 RX ORDER — DOXYCYCLINE HYCLATE 50 MG/1
50 CAPSULE, GELATIN COATED ORAL DAILY
COMMUNITY
Start: 2025-05-23

## 2025-05-27 NOTE — PROGRESS NOTES
History & Physical    Subjective     History of Present Illness:  Patient is a 70 y.o. female presents with gallstones and fatty liver disease.  She has no symptoms.    Chief Complaint   Patient presents with    Consult     Gallbladder       Review of patient's allergies indicates:   Allergen Reactions    Ciprofloxacin     Codeine      Facial itching    Floxacillin Other (See Comments)       Current Medications[1]    Past Medical History:   Diagnosis Date    Breast cyst     Diverticulitis     Encounter for blood transfusion     Follicular cancer of thyroid     thyroid    GERD (gastroesophageal reflux disease)     Hypertension     Nontoxic thyroid nodule 1/27/2019     Past Surgical History:   Procedure Laterality Date    BREAST BIOPSY Left 2011    benign needle biopsy    COLECTOMY      resection    COLONOSCOPY N/A 10/03/2019    Procedure: COLONOSCOPY;  Surgeon: Robi Galdamez MD;  Location: [facility];  Service: Endoscopy;  Laterality: N/A;    COLONOSCOPY N/A 1/28/2025    Procedure: COLONOSCOPY;  Surgeon: Luis Boyce MD;  Location: [facility];  Service: Gastroenterology;  Laterality: N/A;    EYE SURGERY      SHOULDER SURGERY Right     THYROID LOBECTOMY Left 02/27/2019    Procedure: LOBECTOMY, THYROID;  Surgeon: Luis A Barron MD;  Location: [facility] OR [address] [place] [address];  Service: ENT;  Laterality: Left;  NIM tube  Possible total thyroidectomy    THYROIDECTOMY Right 04/10/2019    Procedure: THYROIDECTOMY;  Surgeon: Luis A Barron MD;  Location: [facility] OR [address] [place] [address];  Service: ENT;  Laterality: Right;  NIM tube    UPPER GASTROINTESTINAL ENDOSCOPY       Family History   Problem Relation Name Age of Onset    Hypertension Mother      Heart disease Mother      Cancer Father      Kidney disease Father      Hypertension Father      Stroke Father      Hypertension Sister      Hypertension Brother      Heart disease Brother      Kidney disease Brother      Breast cancer Maternal Aunt          50's     Social History[2]     Review of  Systems:  Review of Systems   Constitutional:  Negative for fever.   HENT: Negative.     Eyes: Negative.    Respiratory: Negative.     Cardiovascular: Negative.    Gastrointestinal: Negative.    Endocrine: Negative.    Genitourinary: Negative.    Musculoskeletal: Negative.    Skin: Negative.    Allergic/Immunologic: Negative.    Neurological: Negative.    Hematological: Negative.    Psychiatric/Behavioral: Negative.            Objective     Vital Signs (Most Recent)  Temp: 97 °F (36.1 °C) (05/27/25 0909)  Pulse: 94 (05/27/25 0909)  BP: (!) 96/58 (05/27/25 0909)  6' (1.829 m)  108.8 kg (239 lb 13.8 oz)     Physical Exam:  Physical Exam  Constitutional:       General: She is not in acute distress.     Appearance: Normal appearance. She is not ill-appearing, toxic-appearing or diaphoretic.   HENT:      Head: Normocephalic.      Nose: Nose normal.   Eyes:      Conjunctiva/sclera: Conjunctivae normal.   Cardiovascular:      Rate and Rhythm: Normal rate and regular rhythm.   Pulmonary:      Effort: Pulmonary effort is normal.   Abdominal:      Palpations: Abdomen is soft.   Musculoskeletal:         General: Normal range of motion.      Cervical back: Normal range of motion.   Skin:     General: Skin is warm.   Neurological:      General: No focal deficit present.      Mental Status: She is alert.   Psychiatric:         Mood and Affect: Mood normal.         Laboratory  Prior LFTs are normal    Diagnostic Results:  Retroperitoneal ultrasound shows gallstones and fatty liver disease.  This seems to be stable from a CT scan from 2021 that also showed gallstones       Assessment and Plan   70-year-old female with gallstones    PLAN:  Risks versus benefits of cholecystectomy versus observation were discussed.  We discussed weight loss as it pertains to fatty liver disease.  Patient declined operative intervention at this point in time.  Follow up as needed.                [1]   Current Outpatient Medications   Medication Sig  Dispense Refill    doxycycline 50 MG capsule Take 50 mg by mouth Daily.      estradiol 0.05 mg/24 hr td ptsw (VIVELLE-DOT) 0.05 mg/24 hr       finasteride (PROSCAR) 5 mg tablet       hydroCHLOROthiazide (HYDRODIURIL) 25 MG tablet Take 1 tablet (25 mg total) by mouth once daily. 90 tablet 3    magnesium oxide (MAG-OX) 400 mg (241.3 mg magnesium) tablet Take 1 tablet (400 mg total) by mouth once daily.      mecobalam-folate-B6-B2-betain 1,000 mcg-3,400 mcg DFE-10 mg Cap Take 1 capsule by mouth Daily.      metoprolol succinate (TOPROL-XL) 25 MG 24 hr tablet Take 1 tablet (25 mg total) by mouth once daily. 30 tablet 11    minoxidiL (LONITEN) 2.5 MG tablet Take 2.5 mg by mouth.      pantoprazole (PROTONIX) 40 MG tablet Take 1 tablet (40 mg total) by mouth once daily. 90 tablet 3    potassium citrate (UROCIT-K) 5 mEq (540 mg) TbSR Take 1 tablet (5 mEq total) by mouth once daily. 90 tablet 3    progesterone (PROMETRIUM) 100 MG capsule       rosuvastatin (CRESTOR) 10 MG tablet TAKE ONE TABLET BY MOUTH ONCE DAILY TO GET TO LDL BELOW 70 TO PREVENT HEART ATTACK AND STROKE      SYNTHROID 150 mcg tablet Take 1 tablet (150 mcg total) by mouth before breakfast. Brand name Synthroid 90 tablet 3    candesartan (ATACAND) 8 MG tablet Take 1 tablet (8 mg total) by mouth once daily. 90 tablet 3    fluocinolone acetonide oiL 0.01 % Drop  (Patient not taking: Reported on 5/27/2025)       No current facility-administered medications for this visit.   [2]   Social History  Tobacco Use    Smoking status: Never    Smokeless tobacco: Never   Substance Use Topics    Alcohol use: No    Drug use: Never

## (undated) DEVICE — SUT VICRYL 4-0 RB1 27IN UD

## (undated) DEVICE — ELECTRODE REM PLYHSV RETURN 9

## (undated) DEVICE — WARMER DRAPE STERILE LF

## (undated) DEVICE — SEE MEDLINE ITEM 154981

## (undated) DEVICE — ADHESIVE MASTISOL VIAL 48/BX

## (undated) DEVICE — SEE MEDLINE ITEM 152622

## (undated) DEVICE — GAUZE SPONGE PEANUT STRL

## (undated) DEVICE — SKINMARKER & RULER REGULAR X-F

## (undated) DEVICE — SUT 2/0 30IN SILK BLK BRAI

## (undated) DEVICE — SUT VICRYL 3-0 27 SH

## (undated) DEVICE — NDL HYPO REG 25G X 1 1/2

## (undated) DEVICE — CONTAINER SPECIMEN STRL 4OZ

## (undated) DEVICE — SEE MEDLINE ITEM 157194

## (undated) DEVICE — DRAPE INCISE IOBAN 2 23X17IN

## (undated) DEVICE — ELECTRODE NDL

## (undated) DEVICE — SEE MEDLINE ITEM 157128

## (undated) DEVICE — TRAY MINOR GEN SURG

## (undated) DEVICE — SUT MONOCRYL 4-0 PS-2

## (undated) DEVICE — ADHESIVE DERMABOND ADVANCED

## (undated) DEVICE — HOOK LONE STAR BLUNT 12MM

## (undated) DEVICE — CLIP MED TICALL

## (undated) DEVICE — BLADE SURG #15 CARBON STEEL

## (undated) DEVICE — SUT ETHILON 3-0 PS2 18 BLK

## (undated) DEVICE — SUT VICRYL PLUS 3-0 SH 18IN

## (undated) DEVICE — ELECTRODE BLADE INSULATED 1 IN

## (undated) DEVICE — SUT LIGACLIP SMALL XTRA

## (undated) DEVICE — CORD BIPOLAR 12 FOOT

## (undated) DEVICE — GOWN SURGICAL X-LARGE

## (undated) DEVICE — SUT 3-0 12-18IN SILK

## (undated) DEVICE — KIT POWDER ABSORBABLE GELATIN

## (undated) DEVICE — SHEET EENT SPLIT

## (undated) DEVICE — SUT 2-0 12-18IN SILK

## (undated) DEVICE — SPONGE GAUZE 16PLY 4X4